# Patient Record
Sex: FEMALE | Race: ASIAN | NOT HISPANIC OR LATINO | Employment: FULL TIME | URBAN - METROPOLITAN AREA
[De-identification: names, ages, dates, MRNs, and addresses within clinical notes are randomized per-mention and may not be internally consistent; named-entity substitution may affect disease eponyms.]

---

## 2017-01-13 ENCOUNTER — HOSPITAL ENCOUNTER (OUTPATIENT)
Facility: AMBULATORY SURGERY CENTER | Age: 52
Discharge: HOME | End: 2017-01-13
Attending: INTERNAL MEDICINE | Admitting: INTERNAL MEDICINE
Payer: COMMERCIAL

## 2017-01-13 VITALS
DIASTOLIC BLOOD PRESSURE: 80 MMHG | HEART RATE: 70 BPM | OXYGEN SATURATION: 100 % | TEMPERATURE: 98.7 F | SYSTOLIC BLOOD PRESSURE: 107 MMHG | RESPIRATION RATE: 16 BRPM

## 2017-01-13 LAB — COLONOSCOPY: NORMAL

## 2017-01-13 PROCEDURE — 45378 DIAGNOSTIC COLONOSCOPY: CPT

## 2017-01-13 PROCEDURE — G8907 PT DOC NO EVENTS ON DISCHARG: HCPCS

## 2017-01-13 PROCEDURE — G8918 PT W/O PREOP ORDER IV AB PRO: HCPCS

## 2017-01-13 RX ORDER — LIDOCAINE 40 MG/G
CREAM TOPICAL
Status: DISCONTINUED | OUTPATIENT
Start: 2017-01-13 | End: 2017-01-13 | Stop reason: HOSPADM

## 2017-01-13 RX ORDER — ONDANSETRON 2 MG/ML
4 INJECTION INTRAMUSCULAR; INTRAVENOUS
Status: DISCONTINUED | OUTPATIENT
Start: 2017-01-13 | End: 2017-01-13 | Stop reason: HOSPADM

## 2017-01-13 RX ORDER — FENTANYL CITRATE 50 UG/ML
INJECTION, SOLUTION INTRAMUSCULAR; INTRAVENOUS PRN
Status: DISCONTINUED | OUTPATIENT
Start: 2017-01-13 | End: 2017-01-13 | Stop reason: HOSPADM

## 2017-07-27 ENCOUNTER — OFFICE VISIT (OUTPATIENT)
Dept: FAMILY MEDICINE | Facility: CLINIC | Age: 52
End: 2017-07-27
Payer: COMMERCIAL

## 2017-07-27 VITALS
DIASTOLIC BLOOD PRESSURE: 70 MMHG | TEMPERATURE: 97.9 F | SYSTOLIC BLOOD PRESSURE: 112 MMHG | HEART RATE: 57 BPM | HEIGHT: 64 IN | OXYGEN SATURATION: 99 % | WEIGHT: 135.3 LBS | BODY MASS INDEX: 23.1 KG/M2

## 2017-07-27 DIAGNOSIS — N94.10 DYSPAREUNIA IN FEMALE: ICD-10-CM

## 2017-07-27 DIAGNOSIS — R73.03 PRE-DIABETES: ICD-10-CM

## 2017-07-27 DIAGNOSIS — Z71.84 TRAVEL ADVICE ENCOUNTER: ICD-10-CM

## 2017-07-27 DIAGNOSIS — T75.3XXS: ICD-10-CM

## 2017-07-27 DIAGNOSIS — R87.610 ASCUS OF CERVIX WITH NEGATIVE HIGH RISK HPV: ICD-10-CM

## 2017-07-27 DIAGNOSIS — Z00.00 ENCOUNTER FOR ROUTINE ADULT HEALTH EXAMINATION WITHOUT ABNORMAL FINDINGS: Primary | ICD-10-CM

## 2017-07-27 LAB
GLUCOSE SERPL-MCNC: 110 MG/DL (ref 70–99)
HBA1C MFR BLD: 6.1 % (ref 4.3–6)

## 2017-07-27 PROCEDURE — 99396 PREV VISIT EST AGE 40-64: CPT | Performed by: FAMILY MEDICINE

## 2017-07-27 PROCEDURE — 36415 COLL VENOUS BLD VENIPUNCTURE: CPT | Performed by: FAMILY MEDICINE

## 2017-07-27 PROCEDURE — 82947 ASSAY GLUCOSE BLOOD QUANT: CPT | Performed by: FAMILY MEDICINE

## 2017-07-27 PROCEDURE — 83036 HEMOGLOBIN GLYCOSYLATED A1C: CPT | Performed by: FAMILY MEDICINE

## 2017-07-27 PROCEDURE — 99212 OFFICE O/P EST SF 10 MIN: CPT | Mod: 25 | Performed by: FAMILY MEDICINE

## 2017-07-27 RX ORDER — CIPROFLOXACIN 500 MG/1
500 TABLET, FILM COATED ORAL 2 TIMES DAILY
Qty: 6 TABLET | Refills: 0 | Status: SHIPPED | OUTPATIENT
Start: 2017-07-27 | End: 2018-10-04

## 2017-07-27 RX ORDER — AZITHROMYCIN 250 MG/1
TABLET, FILM COATED ORAL
Qty: 6 TABLET | Refills: 0 | Status: SHIPPED | OUTPATIENT
Start: 2017-07-27 | End: 2018-10-04

## 2017-07-27 ASSESSMENT — PAIN SCALES - GENERAL: PAINLEVEL: NO PAIN (0)

## 2017-07-27 NOTE — NURSING NOTE
"Chief Complaint   Patient presents with     Physical       Initial /70 (BP Location: Right arm, Patient Position: Chair, Cuff Size: Adult Regular)  Pulse 57  Temp 97.9  F (36.6  C) (Oral)  Ht 1.635 m (5' 4.37\")  Wt 61.4 kg (135 lb 4.8 oz)  LMP 04/10/2015 (Approximate)  SpO2 99%  Breastfeeding? No  BMI 22.96 kg/m2 Estimated body mass index is 22.96 kg/(m^2) as calculated from the following:    Height as of this encounter: 1.635 m (5' 4.37\").    Weight as of this encounter: 61.4 kg (135 lb 4.8 oz).  Medication Reconciliation: complete     ANNA Holly MA      "

## 2017-07-27 NOTE — MR AVS SNAPSHOT
After Visit Summary   7/27/2017    Sudhakar Zarate    MRN: [id number]           Patient Information     Date Of Birth          1965        Visit Information        Provider Department      7/27/2017 9:20 AM Coreen Mike MD [facility]        Today's Diagnoses     Encounter for routine adult health examination without abnormal findings    -  1    Travel sickness, sequela        Travel advice encounter        Pre-diabetes        ASCUS of cervix with negative high risk HPV        Dyspareunia in female          Care Instructions    Follow up with travel clinic, if needed - start oral typhoid medications    Follow up with Orthopedics to discuss right wrist     For vaginal dryness/painful intercourse- trial K-Y jelly. Or, you can use a daily lubricant called Replens      Preventive Health Recommendations  Female Ages 50 - 64    Yearly exam: See your health care provider every year in order to  o Review health changes.   o Discuss preventive care.    o Review your medicines if your doctor has prescribed any.      Get a Pap test every three years (unless you have an abnormal result and your provider advises testing more often).    If you get Pap tests with HPV test, you only need to test every 5 years, unless you have an abnormal result.     You do not need a Pap test if your uterus was removed (hysterectomy) and you have not had cancer.    You should be tested each year for STDs (sexually transmitted diseases) if you're at risk.     Have a mammogram every 1 to 2 years.    Have a colonoscopy at age 50, or have a yearly FIT test (stool test). These exams screen for colon cancer.      Have a cholesterol test every 5 years, or more often if advised.    Have a diabetes test (fasting glucose) every three years. If you are at risk for diabetes, you should have this test more often.     If you are at risk for osteoporosis (brittle bone disease), think about having a bone density scan  (DEXA).    Shots: Get a flu shot each year. Get a tetanus shot every 10 years.    Nutrition:     Eat at least 5 servings of fruits and vegetables each day.    Eat whole-grain bread, whole-wheat pasta and brown rice instead of white grains and rice.    Talk to your provider about Calcium and Vitamin D.     Lifestyle    Exercise at least 150 minutes a week (30 minutes a day, 5 days a week). This will help you control your weight and prevent disease.    Limit alcohol to one drink per day.    No smoking.     Wear sunscreen to prevent skin cancer.     See your dentist every six months for an exam and cleaning.    See your eye doctor every 1 to 2 years.            Follow-ups after your visit        Who to contact     If you have questions or need follow up information about today's clinic visit or your schedule please contact [facility] directly at [phone].  Normal or non-critical lab and imaging results will be communicated to you by MyChart, letter or phone within 4 business days after the clinic has received the results. If you do not hear from us within 7 days, please contact the clinic through [employer] or phone. If you have a critical or abnormal lab result, we will notify you by phone as soon as possible.  Submit refill requests through be2 or call your pharmacy and they will forward the refill request to us. Please allow 3 business days for your refill to be completed.          Additional Information About Your Visit        MyChart Information     be2 gives you secure access to your electronic health record. If you see a primary care provider, you can also send messages to your care team and make appointments. If you have questions, please call your primary care clinic.  If you do not have a primary care provider, please call [phone] and they will assist you.        Care EveryWhere ID     This is your Care EveryWhere ID. This could be used by other organizations to access your  "[facility] medical records  [id number]        Your Vitals Were     Pulse Temperature Height Last Period Pulse Oximetry Breastfeeding?    57 97.9  F (36.6  C) (Oral) 1.635 m (5' 4.37\") 04/10/2015 (Approximate) 99% No    BMI (Body Mass Index)                   22.96 kg/m2            Blood Pressure from Last 3 Encounters:   07/27/17 112/70   01/13/17 107/80   12/02/16 110/82    Weight from Last 3 Encounters:   07/27/17 61.4 kg (135 lb 4.8 oz)   12/02/16 61.1 kg (134 lb 11.2 oz)   01/09/17 60.3 kg (133 lb)              We Performed the Following     Glucose     Hemoglobin A1c          Today's Medication Changes          These changes are accurate as of: 7/27/17 10:04 AM.  If you have any questions, ask your nurse or doctor.               Start taking these medicines.        Dose/Directions    azithromycin 250 MG tablet   Commonly known as:  ZITHROMAX Z-SANDRA   Used for:  Travel sickness, sequela   Started by:  Coreen Mike MD        Two tabs on day #1, then one tab daily x 4 more days   Quantity:  6 tablet   Refills:  0       ciprofloxacin 500 MG tablet   Commonly known as:  CIPRO   Used for:  Travel sickness, sequela   Started by:  Coreen Mike MD        Dose:  500 mg   Take 1 tablet (500 mg) by mouth 2 times daily   Quantity:  6 tablet   Refills:  0       typhoid CR capsule   Commonly known as:  VIVOTIF   Used for:  Travel advice encounter   Started by:  Coreen Mike MD        Dose:  1 capsule   Take 1 capsule by mouth every other day For typhoid vaccine: take 1 hour before meal with cold or lukewarm drink. Avoid alcohol within 1 hour of each dose. Do not cut/crush/chew.   Quantity:  4 capsule   Refills:  0            Where to get your medicines      These medications were sent to [id number] - [place], [place] - [address] AT [facility] [place] [address] [facility] [address]  [address], [place] [address]    Hours:  24-hours Phone:  [phone]     " azithromycin 250 MG tablet    ciprofloxacin 500 MG tablet         Some of these will need a paper prescription and others can be bought over the counter.  Ask your nurse if you have questions.     Bring a paper prescription for each of these medications     typhoid CR capsule                Primary Care Provider Office Phone # Fax #    Coreen Mike [phone]       [facility] [address] [place] [address] [address]  [facility] [address]        Equal Access to Services     MERLY BARBOZA : Hadii aad ku hadasho Soomaali, waaxda luqadaha, qaybta kaalmada adeegyada, waxay idiin hayaan adeeg kharash lawicho . So [place] -[phone].    ATENCIÓN: Si gene gonzalez, tiene a eduardo disposición servicios gratuitos de asistencia lingüística. Jeir al [phone].    We comply with applicable federal civil rights laws and [place] laws. We do not discriminate on the basis of race, color, national origin, age, disability sex, sexual orientation or gender identity.            Thank you!     Thank you for choosing [facility]  for your care. Our goal is always to provide you with excellent care. Hearing back from our patients is one way we can continue to improve our services. Please take a few minutes to complete the written survey that you may receive in the mail after your visit with us. Thank you!             Your Updated Medication List - Protect others around you: Learn how to safely use, store and throw away your medicines at www.disposemymeds.org.          This list is accurate as of: 7/27/17 10:04 AM.  Always use your most recent med list.                   Brand Name Dispense Instructions for use Diagnosis    azithromycin 250 MG tablet    ZITHROMAX Z-SANDRA    6 tablet    Two tabs on day #1, then one tab daily x 4 more days    Travel sickness, sequela       calcium carbonate 1250 MG tablet    OS-FABIOLA 500 mg [place]. [place]     Take 500 mg by mouth daily        ciprofloxacin 500 MG tablet    CIPRO    6  tablet    Take 1 tablet (500 mg) by mouth 2 times daily    Travel sickness, sequela       COENZYME Q-10 PO           FISH OIL PO      1 PO QD        MULTIVITAMIN TABS   OR     30    one tab daily        typhoid CR capsule    VIVOTIF    4 capsule    Take 1 capsule by mouth every other day For typhoid vaccine: take 1 hour before meal with cold or lukewarm drink. Avoid alcohol within 1 hour of each dose. Do not cut/crush/chew.    Travel advice encounter       vitamin B complex with vitamin C Tabs tablet      Take 1 tablet by mouth daily        vitamin D 1000 UNITS capsule      Take 1 capsule by mouth daily.

## 2017-07-27 NOTE — PATIENT INSTRUCTIONS
Follow up with travel clinic, if needed - start oral typhoid medications    Follow up with Orthopedics to discuss right wrist     For vaginal dryness/painful intercourse- trial K-Y jelly. Or, you can use a daily lubricant called Replens      Preventive Health Recommendations  Female Ages 50 - 64    Yearly exam: See your health care provider every year in order to  o Review health changes.   o Discuss preventive care.    o Review your medicines if your doctor has prescribed any.      Get a Pap test every three years (unless you have an abnormal result and your provider advises testing more often).    If you get Pap tests with HPV test, you only need to test every 5 years, unless you have an abnormal result.     You do not need a Pap test if your uterus was removed (hysterectomy) and you have not had cancer.    You should be tested each year for STDs (sexually transmitted diseases) if you're at risk.     Have a mammogram every 1 to 2 years.    Have a colonoscopy at age 50, or have a yearly FIT test (stool test). These exams screen for colon cancer.      Have a cholesterol test every 5 years, or more often if advised.    Have a diabetes test (fasting glucose) every three years. If you are at risk for diabetes, you should have this test more often.     If you are at risk for osteoporosis (brittle bone disease), think about having a bone density scan (DEXA).    Shots: Get a flu shot each year. Get a tetanus shot every 10 years.    Nutrition:     Eat at least 5 servings of fruits and vegetables each day.    Eat whole-grain bread, whole-wheat pasta and brown rice instead of white grains and rice.    Talk to your provider about Calcium and Vitamin D.     Lifestyle    Exercise at least 150 minutes a week (30 minutes a day, 5 days a week). This will help you control your weight and prevent disease.    Limit alcohol to one drink per day.    No smoking.     Wear sunscreen to prevent skin cancer.     See your dentist every six  months for an exam and cleaning.    See your eye doctor every 1 to 2 years.

## 2017-07-27 NOTE — PROGRESS NOTES
SUBJECTIVE:   CC: Sudhakar Zarate is an 52 year old woman who presents for preventive health visit.     Healthy Habits:    Do you get at least three servings of calcium containing foods daily (dairy, green leafy vegetables, etc.)? yes    Amount of exercise or daily activities, outside of work: 7 day(s) per week    Problems taking medications regularly No    Medication side effects: No    Have you had an eye exam in the past two years? yes    Do you see a dentist twice per year? Yes    Do you have sleep apnea, excessive snoring or daytime drowsiness?no    Right Wrist -- Discussed work comp issues, went to PT which helped, but she still experiences symptoms with using a computer mouse, typing, etc. She reported improved symptoms with rest and not being at work for a few days. She has tried multiple braces and ergonomic mouses/keyboards.     She stated that she was seen by orthopedics at [facility], and her work comp denied any MRI's.     Menopause -- Mild dyspareunia and mild insomnia ongoing.      Prediabetes -- Sudhakar stated that she has been working on improving her diet and has sought out resources to  her in doing so.     TRAVEL: she is traveling again to [place] in a week. Has brought cipro with her in past for traveler's diarrhea/uti and also brings zpak in case of bronchitis- she's had this several times from pollution.     Lab Results   Component Value Date    A1C 6.0 12/02/2016    A1C 6.2 08/31/2015    A1C 5.7 09/22/2014    A1C 5.9 09/19/2013    A1C 6.0 04/18/2012     Glucose   Date Value Ref Range Status   12/02/2016 96 70 - 99 mg/dL Final     Comment:     Non Fasting     Creatinine   Date Value Ref Range Status   04/25/2012 0.58 0.52 - 1.04 mg/dL Final     Lipids -- Fish oil tablet and Coenzyme Q-10 tablet daily.     Recent Labs   Lab Test  08/31/15   1221  09/22/14   0852   CHOL  182  139   HDL  89  76   LDL  83  55   TRIG  52  38   CHOLHDLRATIO  2.0  1.8       Past/recent records reviewed and discussed for --  family hx, social hx, surgical hx, medications, immunizations, allergies.      Today's PHQ-2 Score:   PHQ-2 (  [employer]) 2016   Q1: Little interest or pleasure in doing things 0 -   Q2: Feeling down, depressed or hopeless 0 -   PHQ-2 Score 0 -   Q1: Little interest or pleasure in doing things Not at all Not at all   Q2: Feeling down, depressed or hopeless Not at all Not at all   PHQ-2 Score 0 0     Abuse: Current or Past(Physical, Sexual or Emotional)- No  Do you feel safe in your environment - Yes  Social History   Substance Use Topics     Smoking status: Never Smoker     Smokeless tobacco: Never Used     Alcohol use Yes      Comment: rare     The patient does not drink >3 drinks per day nor >7 drinks per week.    Reviewed orders with patient.  Reviewed health maintenance and updated orders accordingly - Yes  Labs reviewed in [facility]    Patient over age 50, mutual decision to screen reflected in health maintenance.    Pertinent mammograms are reviewed under the imaging tab.  History of abnormal Pap smear: NO - age 30- 65 PAP every 3 years recommended    Reviewed and updated as needed this visit by clinical staffTobacco  Allergies  Meds  Med Hx  Surg Hx  Fam Hx  Soc Hx      Reviewed and updated as needed this visit by Provider        Past Medical History:   Diagnosis Date     ASCUS of cervix with negative high risk HPV 2016 ASCUS/Neg HPV. Plan: cotest in 3 years. Due by 19     Female infertility of unspecified origin      Flank pain     following uti. CT abd and urol w/u neg     GERD (gastroesophageal reflux disease) 2012     Mammographic microcalcification     s/p bx : benign breast parenchyma     Spontaneous      x 1, VIP x1        ROS:  10 point ROS of systems including Constitutional, Eyes, Respiratory, Cardiovascular, Gastroenterology, Genitourinary, Integumentary, Muscularskeletal, Psychiatric were all negative except for pertinent positives noted  "in my HPI.    This document serves as a record of the services and decisions personally performed and made by Coreen Mike MD. It was created on their behalf by Flynn Mcmanus, a trained medical scribe. The creation of this document is based the provider's statements to the medical scribe.  Flynn Mcmanus July 27, 2017 9:33 AM    OBJECTIVE:   /70 (BP Location: Right arm, Patient Position: Chair, Cuff Size: Adult Regular)  Pulse 57  Temp 97.9  F (36.6  C) (Oral)  Ht 1.635 m (5' 4.37\")  Wt 61.4 kg (135 lb 4.8 oz)  LMP 04/10/2015 (Approximate)  SpO2 99%  Breastfeeding? No  BMI 22.96 kg/m2  EXAM:  GENERAL APPEARANCE: healthy, alert and no distress  EYES: Eyes grossly normal to inspection, PERRL and conjunctivae and sclerae normal  HENT: ear canals and TM's normal, nose and mouth without ulcers or lesions, oropharynx clear and oral mucous membranes moist  NECK: no adenopathy, no asymmetry, masses, or scars and thyroid normal to palpation  RESP: lungs clear to auscultation - no rales, rhonchi or wheezes  BREAST: normal without masses, tenderness or nipple discharge and no palpable axillary masses or adenopathy  CV: regular rate and rhythm, normal S1 S2, no S3 or S4, no murmur, click or rub, no peripheral edema and peripheral pulses strong  ABDOMEN: soft, nontender, no hepatosplenomegaly, no masses and bowel sounds normal   (female): normal female external genitalia, normal urethral meatus, vaginal mucosal atrophy noted, normal cervix, very mild right adnexal tenderness noted without masses, and uterus without masses or abnormal discharge  MS: no musculoskeletal defects are noted and gait is age appropriate without ataxia  SKIN: no suspicious lesions or rashes to visible skin  NEURO: mentation intact and speech normal  PSYCH: mentation appears normal and affect normal/bright    ASSESSMENT/PLAN:   1. Encounter for routine adult health examination without abnormal findings  - Glucose    2. Travel sickness, " "sequela  3. Travel advice encounter  Pt will be traveling to [place] next week for 3 weeks- abx for prn use. Given. Reviewed CDC traveler's page- typhoid vaccine recommended. Discussed life vaccine pros/con's and appropriate use, etc.   - ciprofloxacin (CIPRO) 500 MG tablet; Take 1 tablet (500 mg) by mouth 2 times daily  Dispense: 6 tablet; Refill: 0  - azithromycin (ZITHROMAX Z-SANDRA) 250 MG tablet; Two tabs on day #1, then one tab daily x 4 more days  Dispense: 6 tablet; Refill: 0  - typhoid (VIVOTIF) CR capsule; Take 1 capsule by mouth every other day For typhoid vaccine: take 1 hour before meal with cold or lukewarm drink. Avoid alcohol within 1 hour of each dose. Do not cut/crush/chew.  Dispense: 4 capsule; Refill: 0    4. Pre-diabetes  Hx of elevated glucose, pt working on diet  - Glucose  - Hemoglobin A1c    5. ASCUS of cervix with negative high risk HPV  See HPI    6. Dyspareunia in female  New since menopause. Discussed otc lubricants. Consider estrogen creams in future if ongoing sx's.       COUNSELING:   Reviewed preventive health counseling, as reflected in patient instructions     reports that she has never smoked. She has never used smokeless tobacco.    Estimated body mass index is 22.96 kg/(m^2) as calculated from the following:    Height as of this encounter: 1.635 m (5' 4.37\").    Weight as of this encounter: 61.4 kg (135 lb 4.8 oz).     Counseling Resources:  ATP IV Guidelines  Pooled Cohorts Equation Calculator  Breast Cancer Risk Calculator  FRAX Risk Assessment  ICSI Preventive Guidelines  Dietary Guidelines for Americans, 2010  USDA's MyPlate  ASA Prophylaxis  Lung CA Screening    The information in this document, created by the medical scribe for me, accurately reflects the services I personally performed and the decisions made by me. I have reviewed and approved this document for accuracy.   MD Coreen Bingham MD  [facility]"

## 2017-07-28 PROBLEM — R79.89 ELEVATED DHEA: Status: RESOLVED | Noted: 2017-07-28 | Resolved: 2017-07-28

## 2017-07-28 PROBLEM — R79.89 ELEVATED DHEA: Status: ACTIVE | Noted: 2017-07-28

## 2018-08-13 ENCOUNTER — OFFICE VISIT (OUTPATIENT)
Dept: PODIATRY | Facility: CLINIC | Age: 53
End: 2018-08-13
Payer: COMMERCIAL

## 2018-08-13 ENCOUNTER — TELEPHONE (OUTPATIENT)
Dept: FAMILY MEDICINE | Facility: CLINIC | Age: 53
End: 2018-08-13

## 2018-08-13 ENCOUNTER — RADIANT APPOINTMENT (OUTPATIENT)
Dept: GENERAL RADIOLOGY | Facility: CLINIC | Age: 53
End: 2018-08-13
Attending: FAMILY MEDICINE
Payer: COMMERCIAL

## 2018-08-13 VITALS
HEIGHT: 64 IN | BODY MASS INDEX: 23.05 KG/M2 | DIASTOLIC BLOOD PRESSURE: 70 MMHG | SYSTOLIC BLOOD PRESSURE: 112 MMHG | WEIGHT: 135 LBS

## 2018-08-13 DIAGNOSIS — M25.562 ACUTE PAIN OF LEFT KNEE: Primary | ICD-10-CM

## 2018-08-13 DIAGNOSIS — M25.562 ACUTE PAIN OF LEFT KNEE: ICD-10-CM

## 2018-08-13 PROCEDURE — 99203 OFFICE O/P NEW LOW 30 MIN: CPT | Performed by: FAMILY MEDICINE

## 2018-08-13 PROCEDURE — 73562 X-RAY EXAM OF KNEE 3: CPT | Performed by: FAMILY MEDICINE

## 2018-08-13 NOTE — LETTER
2018         RE: Sudhakar Zarate  [address] [place] [address]        Dear Colleague,    Thank you for referring your patient, Sudhakar Zarate, to the [facility]. Please see a copy of my visit note below.    HPI   [facility]   Clinic Visit s Aug 13, 2018    PCP: Coreen Mike      Sudhakar is a 53 year old female who is seen as self referral for   Chief Complaint   Patient presents with     Left Knee - Pain       Injury: Reports insidious onset without acute precipitating event.       Location of Pain: left knee medial, nonradiating   Duration of Pain: 4 day(s)  Rating of Pain at worst: 6/10  Rating of Pain Currently: 3/10  Pain is better with: ibuprofen , heating pad, hot bath and ice   Pain is worse with: standing and walking    Treatment so far consists of: no other treatment tried to date  Associated symptoms: swelling Mild  Recent imaging completed: No recent imaging completed.  Prior History of related problems: none    Social History: is employed as a/an programming computers      Past Medical History:   Diagnosis Date     ASCUS of cervix with negative high risk HPV 2016 ASCUS/Neg HPV. Plan: cotest in 3 years. Due by 19     Female infertility of unspecified origin      Flank pain     following uti. CT abd and urol w/u neg     GERD (gastroesophageal reflux disease) 2012     Mammographic microcalcification     s/p bx : benign breast parenchyma     Spontaneous      x 1, VIP x1       Patient Active Problem List    Diagnosis Date Noted     Pre-diabetes 2016     Priority: Medium     ASCUS of cervix with negative high risk HPV 2016     Priority: Medium     16 ASCUS/Neg HPV. Plan: cotest in 3 years. Due by 19       Uterine fibroid 2012     Priority: Medium     CARDIOVASCULAR SCREENING; LDL GOAL LESS THAN 160 10/31/2010     Priority: Medium     History of infertility, female 2008      Priority: Medium     follows with endocrine and reproductive specialist. Slightly elevated DHEA-S (Dr. Schuyler moise 2008: mild CAH with the interference that the ratio of her 17 (OH)hormones is causing with the DHEA-S. F/u prn.   Consider spironolactone for acne.        Nevi 2008     Priority: Medium     vulva         Family History   Problem Relation Age of Onset     C.A.D. Mother      started later 40's or early 50s'.      Diabetes Mother      Hypertension Mother      Cancer Father      lung, former smoker     Diabetes Brother      Diabetes Sister      borderline diabetes     Thyroid Cancer Sister      caught early stage.        Social History     Social History     Marital status:      Spouse name: N/A     Number of children: 1     Years of education: N/A     Occupational History      - IT [employer]     Social History Main Topics     Smoking status: Never Smoker     Smokeless tobacco: Never Used     Alcohol use Yes      Comment: rare     Drug use: No     Sexual activity: Yes     Partners: Male     Other Topics Concern      Service No     Blood Transfusions No     Caffeine Concern No     Occupational Exposure No     Hobby Hazards No     Sleep Concern No     Stress Concern No     Weight Concern No     Special Diet No     Back Care No     Exercise Yes     6 times per week and plays tennis     Bike Helmet No     Seat Belt Yes     Self-Exams No     Parent/Sibling W/ Cabg, Mi Or Angioplasty Before 65f 55m? No     Social History Narrative    Works as  with [employer]     with one daughter    Immigrant from [place] in     Voodoo: Mosque               Past Surgical History:   Procedure Laterality Date     C ANESTH, SECTION       COLONOSCOPY WITH CO2 INSUFFLATION N/A 2017    Procedure: COLONOSCOPY WITH CO2 INSUFFLATION;  Surgeon: Duane, William Charles, MD;  Location: [facility] OR      DILATION/CURETTAGE DIAG/THER NON OB       "following incomplete ab         Review of Systems   Musculoskeletal: Positive for joint pain.   All other systems reviewed and are negative.        Physical Exam   Musculoskeletal:        Left knee: Medial joint line and MCL tenderness noted.     /70  Ht 5' 4.37\" (1.635 m)  Wt 135 lb (61.2 kg)  LMP 04/10/2015 (Approximate)  BMI 22.91 kg/m2  Constitutional:well-developed, well-nourished, and in no distress.   Cardiovascular: Intact distal pulses.    Neurological: alert. Gait Abnormal:   Antalgic gait  Skin: Skin is warm and dry.   Psychiatric: Mood and affect normal.   Respiratory: unlabored, speaks in full sentences  Lymph: no LAD, no lymphangitis    Left Knee Exam   Swelling: None  Effusion: No    Tenderness   The patient is experiencing tenderness in the medial joint line, MCL.    Range of Motion   Extension: Normal  Flexion:     Normal    Tests   McMurrays:  Medial - Negative      Lateral - Positive  Lachman:  Anterior - Negative    Posterior - Negative  Drawer:       Anterior - Negative     Posterior - Negative  Varus:  Negative  Valgus: Positive  Pivot Shift: Negative  Patellar Apprehension: No    Comments:  Lateral Andrea's pain is on the medial side.          X-ray images Ordered and independently reviewed by me in the office today with the patient. X-ray shows:   Recent Results (from the past 48 hour(s))   XR Knee Standing AP Bilat [place] Bilat Lat Left    Narrative    8/13/2018    No fracture, dislocation and or lesion. Normal alignment.  Joint space   maintained no significant arthritis. No appreciable soft tissue   abnormality           ASSESSMENT/PLAN    ICD-10-CM    1. Acute pain of left knee M25.562 XR Knee Standing AP Bilat [place] Bilat Lat Left     order for DME     Medial knee pain with normal x-rays and exam suggestive of MCL sprain though without specific valgus stress incident, however repeated micro-valgus movements during dance class may have created a chronic situation.  " Differential includes meniscus injury as well.  Discussed options including bracing and support with knee brace and reduced activity for 1-2 weeks versus obtaining MRI.  Patient opted for knee bracing trial and if not improved in 1-2 weeks may call for MRI and/or return for recheck.    Again, thank you for allowing me to participate in the care of your patient.        Sincerely,        Jesus Muller MD

## 2018-08-13 NOTE — TELEPHONE ENCOUNTER
Sudhakar Zarate is a 53 year old female who calls with left knee pain.    NURSING ASSESSMENT:  Description:  Patient states last week she started doing some general exercises throughout the week Monday through Thursday, and that after she finished on Thursday she began to have lateral pain felt in her left knee.   Onset/duration:  5 days  Precip. factors:  Patient started doing exercises at home  Associated symptoms:  Left lateral knee pain, pain when walking, slight swelling, tender to the touch.   Improves/worsens symptoms:  Ice improves, walking worsens  Pain scale (0-10)   4/10  Last exam/Treatment:  Over 1 year ago  Allergies: No Known Allergies    MEDICATIONS:   Patient states taking ibuprofen for pain - some relief. She has also been icing and apply heat alternating.     NURSING PLAN: Nursing advice to patient be evaluated by a provider in clinic today    RECOMMENDED DISPOSITION:  See in 4 hours, another person to drive - Patient is in [place] and was wanting to see a provider at that [place] location as her PCP is booked today. She was given the phone number to [place] location to scheduled. Denies further questions.  Will comply with recommendation: Yes  If further questions/concerns or if symptoms do not improve, worsen or new symptoms develop, call your PCP or [place] [facility] as soon as possible.      Guideline used:  Telephone Triage Protocols for Nurses, Fifth Edition, Jackie Hsu RN

## 2018-08-13 NOTE — MR AVS SNAPSHOT
"              After Visit Summary   8/13/2018    Sudhakar Zarate    MRN: [id number]           Patient Information     Date Of Birth          1965        Visit Information        Provider Department      8/13/2018 3:40 PM Jesus Muller MD [facility]        Today's Diagnoses     Acute pain of left knee    -  1       Follow-ups after your visit        Who to contact     If you have questions or need follow up information about today's clinic visit or your schedule please contact [facility] directly at [phone].  Normal or non-critical lab and imaging results will be communicated to you by MyChart, letter or phone within 4 business days after the clinic has received the results. If you do not hear from us within 7 days, please contact the clinic through Assembly[employer] or phone. If you have a critical or abnormal lab result, we will notify you by phone as soon as possible.  Submit refill requests through [employer] or call your pharmacy and they will forward the refill request to us. Please allow 3 business days for your refill to be completed.          Additional Information About Your Visit        MyChart Information     [employer] gives you secure access to your electronic health record. If you see a primary care provider, you can also send messages to your care team and make appointments. If you have questions, please call your primary care clinic.  If you do not have a primary care provider, please call [phone] and they will assist you.        Care EveryWhere ID     This is your Care EveryWhere ID. This could be used by other organizations to access your [place] medical records  [id number]        Your Vitals Were     Height Last Period BMI (Body Mass Index)             5' 4.37\" (1.635 m) 04/10/2015 (Approximate) 22.91 kg/m2          Blood Pressure from Last 3 Encounters:   08/13/18 112/70   07/27/17 112/70   01/13/17 107/80    Weight from Last 3 Encounters:   08/13/18 135 lb (61.2 kg) "   07/27/17 135 lb 4.8 oz (61.4 kg)   12/02/16 134 lb 11.2 oz (61.1 kg)                 Today's Medication Changes          These changes are accurate as of 8/13/18 11:59 PM.  If you have any questions, ask your nurse or doctor.               Start taking these medicines.        Dose/Directions    order for DME   Used for:  Acute pain of left knee   Started by:  Jesus Muller MD        Equipment being ordered: hinged knee brace medium   Quantity:  1 Device   Refills:  0            Where to get your medicines      Some of these will need a paper prescription and others can be bought over the counter.  Ask your nurse if you have questions.     Bring a paper prescription for each of these medications     order for DME                Primary Care Provider Office Phone # Fax #    Coreen Ale Mike [phone] [address]  [facility] [address]        Equal Access to Services     [facility]: Hadii aad ku hadasho Soomaali, waaxda luqadaha, qaybta kaalmada adeegyada, waxay jesikain hayaan kati lopez . So [facility] [phone].    ATENCIÓN: Si habla español, tiene a eduardo disposición servicios gratuitos de asistencia lingüística. Jeri al [phone].    We comply with applicable federal civil rights laws and [place] laws. We do not discriminate on the basis of race, color, national origin, age, disability, sex, sexual orientation, or gender identity.            Thank you!     Thank you for choosing [facility]  for your care. Our goal is always to provide you with excellent care. Hearing back from our patients is one way we can continue to improve our services. Please take a few minutes to complete the written survey that you may receive in the mail after your visit with us. Thank you!             Your Updated Medication List - Protect others around you: Learn how to safely use, store and throw away your medicines at www.disposemymeds.org.          This list  is accurate as of 8/13/18 11:59 PM.  Always use your most recent med list.                   Brand Name Dispense Instructions for use Diagnosis    azithromycin 250 MG tablet    ZITHROMAX Z-SANDRA    6 tablet    Two tabs on day #1, then one tab daily x 4 more days    Travel sickness, sequela       calcium carbonate 500 MG tablet    OS-FABIOLA 500 mg [place]. [place]     Take 500 mg by mouth daily        ciprofloxacin 500 MG tablet    CIPRO    6 tablet    Take 1 tablet (500 mg) by mouth 2 times daily    Travel sickness, sequela       COENZYME Q-10 PO           FISH OIL PO      1 PO QD        MULTIVITAMIN TABS   OR     30    one tab daily        order for DME     1 Device    Equipment being ordered: hinged knee brace medium    Acute pain of left knee       typhoid CR capsule    VIVOTIF    4 capsule    Take 1 capsule by mouth every other day For typhoid vaccine: take 1 hour before meal with cold or lukewarm drink. Avoid alcohol within 1 hour of each dose. Do not cut/crush/chew.    Travel advice encounter       vitamin B complex with vitamin C Tabs tablet      Take 1 tablet by mouth daily        vitamin D 1000 units capsule      Take 1 capsule by mouth daily.

## 2018-08-13 NOTE — TELEPHONE ENCOUNTER
Reason for Call:  Other     Detailed comments: Patient injured her left leg and asking which options she would have of what kind of provider she should see?    Phone Number Patient can be reached at: Cell number on file:    Telephone Information:   Mobile [phone]       Best Time: any    Can we leave a detailed message on this number? YES    Call taken on 8/13/2018 at 10:35 AM by Anali Ramírez

## 2018-08-13 NOTE — PROGRESS NOTES
[facility]   Clinic Visit s Aug 13, 2018    PCP: Coreen Mike Ale      Sudhakar is a 53 year old female who is seen as self referral for   Chief Complaint   Patient presents with     Left Knee - Pain       Injury: Reports insidious onset without acute precipitating event.       Location of Pain: left knee medial, nonradiating   Duration of Pain: 4 day(s)  Rating of Pain at worst: 6/10  Rating of Pain Currently: 3/10  Pain is better with: ibuprofen , heating pad, hot bath and ice   Pain is worse with: standing and walking    Treatment so far consists of: no other treatment tried to date  Associated symptoms: swelling Mild  Recent imaging completed: No recent imaging completed.  Prior History of related problems: none    Social History: is employed as a/an programming computers      Past Medical History:   Diagnosis Date     ASCUS of cervix with negative high risk HPV 2016 ASCUS/Neg HPV. Plan: cotest in 3 years. Due by 19     Female infertility of unspecified origin      Flank pain     following uti. CT abd and urol w/u neg     GERD (gastroesophageal reflux disease) 2012     Mammographic microcalcification     s/p bx : benign breast parenchyma     Spontaneous      x 1, VIP x1       Patient Active Problem List    Diagnosis Date Noted     Pre-diabetes 2016     Priority: Medium     ASCUS of cervix with negative high risk HPV 2016     Priority: Medium     16 ASCUS/Neg HPV. Plan: cotest in 3 years. Due by 19       Uterine fibroid 2012     Priority: Medium     CARDIOVASCULAR SCREENING; LDL GOAL LESS THAN 160 10/31/2010     Priority: Medium     History of infertility, female 2008     Priority: Medium     follows with endocrine and reproductive specialist. Slightly elevated DHEA-S (Dr. Schuyler moise 2008: mild CAH with the interference that the ratio of her 17 (OH)hormones is causing with the DHEA-S. F/u prn.    Consider spironolactone for acne.        Nevi 2008     Priority: Medium     vulva         Family History   Problem Relation Age of Onset     C.A.D. Mother      started later 40's or early 50s'.      Diabetes Mother      Hypertension Mother      Cancer Father      lung, former smoker     Diabetes Brother      Diabetes Sister      borderline diabetes     Thyroid Cancer Sister      caught early stage.        Social History     Social History     Marital status:      Spouse name: N/A     Number of children: 1     Years of education: N/A     Occupational History      - IT [employer]     Social History Main Topics     Smoking status: Never Smoker     Smokeless tobacco: Never Used     Alcohol use Yes      Comment: rare     Drug use: No     Sexual activity: Yes     Partners: Male     Other Topics Concern      Service No     Blood Transfusions No     Caffeine Concern No     Occupational Exposure No     Hobby Hazards No     Sleep Concern No     Stress Concern No     Weight Concern No     Special Diet No     Back Care No     Exercise Yes     6 times per week and plays tennis     Bike Helmet No     Seat Belt Yes     Self-Exams No     Parent/Sibling W/ Cabg, Mi Or Angioplasty Before 65f 55m? No     Social History Narrative    Works as  with [employer]     with one daughter    Immigrant from [place] in     Christianity: Taoism               Past Surgical History:   Procedure Laterality Date     C ANESTH, SECTION       COLONOSCOPY WITH CO2 INSUFFLATION N/A 2017    Procedure: COLONOSCOPY WITH CO2 INSUFFLATION;  Surgeon: Duane, William Charles, MD;  Location: [facility] OR     HC DILATION/CURETTAGE DIAG/THER NON OB      following incomplete ab         Review of Systems   Musculoskeletal: Positive for joint pain.   All other systems reviewed and are negative.        Physical Exam   Musculoskeletal:        Left knee: Medial joint line and MCL tenderness  "noted.     /70  Ht 5' 4.37\" (1.635 m)  Wt 135 lb (61.2 kg)  LMP 04/10/2015 (Approximate)  BMI 22.91 kg/m2  Constitutional:well-developed, well-nourished, and in no distress.   Cardiovascular: Intact distal pulses.    Neurological: alert. Gait Abnormal:   Antalgic gait  Skin: Skin is warm and dry.   Psychiatric: Mood and affect normal.   Respiratory: unlabored, speaks in full sentences  Lymph: no LAD, no lymphangitis    Left Knee Exam   Swelling: None  Effusion: No    Tenderness   The patient is experiencing tenderness in the medial joint line, MCL.    Range of Motion   Extension: Normal  Flexion:     Normal    Tests   McMurrays:  Medial - Negative      Lateral - Positive  Lachman:  Anterior - Negative    Posterior - Negative  Drawer:       Anterior - Negative     Posterior - Negative  Varus:  Negative  Valgus: Positive  Pivot Shift: Negative  Patellar Apprehension: No    Comments:  Lateral Andrea's pain is on the medial side.          X-ray images Ordered and independently reviewed by me in the office today with the patient. X-ray shows:   Recent Results (from the past 48 hour(s))   XR Knee Standing AP Bilat [place] Bilat Lat Left    Narrative    8/13/2018    No fracture, dislocation and or lesion. Normal alignment.  Joint space   maintained no significant arthritis. No appreciable soft tissue   abnormality           ASSESSMENT/PLAN    ICD-10-CM    1. Acute pain of left knee M25.562 XR Knee Standing AP Bilat [place] Bilat Lat Left     order for DME     Medial knee pain with normal x-rays and exam suggestive of MCL sprain though without specific valgus stress incident, however repeated micro-valgus movements during dance class may have created a chronic situation.  Differential includes meniscus injury as well.  Discussed options including bracing and support with knee brace and reduced activity for 1-2 weeks versus obtaining MRI.  Patient opted for knee bracing trial and if not improved in 1-2 weeks may " call for MRI and/or return for recheck.

## 2018-08-20 DIAGNOSIS — M25.562 ACUTE PAIN OF LEFT KNEE: Primary | ICD-10-CM

## 2018-08-22 ENCOUNTER — THERAPY VISIT (OUTPATIENT)
Dept: PHYSICAL THERAPY | Facility: CLINIC | Age: 53
End: 2018-08-22
Attending: FAMILY MEDICINE
Payer: COMMERCIAL

## 2018-08-22 DIAGNOSIS — M25.562 LEFT KNEE PAIN: Primary | ICD-10-CM

## 2018-08-22 PROCEDURE — 97110 THERAPEUTIC EXERCISES: CPT | Mod: GP | Performed by: PHYSICAL THERAPIST

## 2018-08-22 PROCEDURE — 97140 MANUAL THERAPY 1/> REGIONS: CPT | Mod: GP | Performed by: PHYSICAL THERAPIST

## 2018-08-22 PROCEDURE — 97161 PT EVAL LOW COMPLEX 20 MIN: CPT | Mod: GP | Performed by: PHYSICAL THERAPIST

## 2018-08-22 ASSESSMENT — ACTIVITIES OF DAILY LIVING (ADL)
HOW_WOULD_YOU_RATE_THE_OVERALL_FUNCTION_OF_YOUR_KNEE_DURING_YOUR_USUAL_DAILY_ACTIVITIES?: ABNORMAL
LIMPING: THE SYMPTOM AFFECTS MY ACTIVITY MODERATELY
KNEE_ACTIVITY_OF_DAILY_LIVING_SCORE: 55.71
STAND: ACTIVITY IS MINIMALLY DIFFICULT
KNEE_ACTIVITY_OF_DAILY_LIVING_SUM: 39
SQUAT: ACTIVITY IS FAIRLY DIFFICULT
STIFFNESS: THE SYMPTOM AFFECTS MY ACTIVITY MODERATELY
KNEEL ON THE FRONT OF YOUR KNEE: ACTIVITY IS SOMEWHAT DIFFICULT
GIVING WAY, BUCKLING OR SHIFTING OF KNEE: THE SYMPTOM AFFECTS MY ACTIVITY MODERATELY
SWELLING: THE SYMPTOM AFFECTS MY ACTIVITY SLIGHTLY
RISE FROM A CHAIR: ACTIVITY IS MINIMALLY DIFFICULT
WALK: ACTIVITY IS SOMEWHAT DIFFICULT
WEAKNESS: THE SYMPTOM AFFECTS MY ACTIVITY MODERATELY
HOW_WOULD_YOU_RATE_THE_CURRENT_FUNCTION_OF_YOUR_KNEE_DURING_YOUR_USUAL_DAILY_ACTIVITIES_ON_A_SCALE_FROM_0_TO_100_WITH_100_BEING_YOUR_LEVEL_OF_KNEE_FUNCTION_PRIOR_TO_YOUR_INJURY_AND_0_BEING_THE_INABILITY_TO_PERFORM_ANY_OF_YOUR_USUAL_DAILY_ACTIVITIES?: 50
GO DOWN STAIRS: ACTIVITY IS FAIRLY DIFFICULT
RAW_SCORE: 39
SIT WITH YOUR KNEE BENT: ACTIVITY IS MINIMALLY DIFFICULT
AS_A_RESULT_OF_YOUR_KNEE_INJURY,_HOW_WOULD_YOU_RATE_YOUR_CURRENT_LEVEL_OF_DAILY_ACTIVITY?: ABNORMAL
GO UP STAIRS: ACTIVITY IS SOMEWHAT DIFFICULT
PAIN: THE SYMPTOM AFFECTS MY ACTIVITY SLIGHTLY

## 2018-08-22 NOTE — PROGRESS NOTES
[facility] Initial Evaluation  Subjective:  Patient is a 53 year old female presenting with rehab left knee hpi.   Sudhakar Zarate is a 53 year old female with a left knee condition.  Condition occurred with:  Repetition/overuse.  Condition occurred: at home.  This is a new condition  Patient is referred with a 2 week hx of L medial knee pain. She was working in the yard, doing repetitive quatting and kneeling and noted pain later that day. She noted mild swelling and loss of ROM. Pain is worse with walking more than a block, stairs and squatting. X-rays were negative..    Patient reports pain:  In the joint and medial.    Pain is described as aching and is constant and reported as 3/10.  Associated symptoms:  Loss of strength and loss of motion/stiffness. Pain is worse during the day.  Symptoms are exacerbated by kneeling, walking, bending/squatting, ascending stairs and descending stairs and relieved by rest.  Since onset symptoms are unchanged.  Special tests:  X-ray.      General health as reported by patient is good.          Current occupation is IT.  Patient is working in normal job without restrictions.  Primary job tasks include:  Prolonged sitting.    Barriers include:  None as reported by the patient.    Red flags:  None as reported by the patient.                        Objective:    Gait:    Gait Type:  Antalgic   Assistive Devices:  None  Deviations:  Knee:  Knee extension decr L                                                      Knee Evaluation:  ROM:  Strength wnl knee: L hip abduction and extension 4/5.    PROM      Extension: Left: 7    Right:  0  Flexion: Left: 130    Right:  145    Endfeel: springy L  Strength:     Extension:  Left: 4/5    Pain:+      Right: 5/5   Pain:  Flexion:  Left: 4+/5    Pain:+      Right: 5/5   Pain:    Quad Set Left: Good    Pain:   Quad Set Right: WNL    Pain:  Ligament Testing:  Normal                Special Tests:   Left knee positive for the following  special tests:  Meniscal    Palpation:    Left knee tenderness present at:  Medial Joint Line    Edema:  Normal    Mobility Testing:  Not Assessed            Functional Testing:  not assessed                  General     ROS    Assessment/Plan:    Patient is a 53 year old female with left side knee complaints.    Patient has the following significant findings with corresponding treatment plan.                Diagnosis 1:  Left knee pain  Pain -  hot/cold therapy, manual therapy, splint/taping/bracing/orthotics, self management, education and home program  Decreased joint mobility - manual therapy, therapeutic exercise and home program  Decreased strength - therapeutic exercise, therapeutic activities and home program  Impaired gait - gait training and home program  Impaired muscle performance - neuro re-education and home program  Decreased function - therapeutic activities and home program    Therapy Evaluation Codes:   1) History comprised of:   Personal factors that impact the plan of care:      None.    Comorbidity factors that impact the plan of care are:      None.     Medications impacting care: None.  2) Examination of Body Systems comprised of:   Body structures and functions that impact the plan of care:      Knee.   Activity limitations that impact the plan of care are:      Sports, Squatting/kneeling, Stairs and Walking.  3) Clinical presentation characteristics are:   Stable/Uncomplicated.  4) Decision-Making    Low complexity using standardized patient assessment instrument and/or measureable assessment of functional outcome.  Cumulative Therapy Evaluation is: Low complexity.    Previous and current functional limitations:  (See Goal Flow Sheet for this information)    Short term and Long term goals: (See Goal Flow Sheet for this information)     Communication ability:  Patient appears to be able to clearly communicate and understand verbal and written communication and follow directions  correctly.  Treatment Explanation - The following has been discussed with the patient:   RX ordered/plan of care  Anticipated outcomes  Possible risks and side effects  This patient would benefit from PT intervention to resume normal activities.   Rehab potential is good.    Frequency:  1-2 X week, once daily  Duration:  for 3 weeks  Discharge Plan:  Achieve all LTG.  Independent in home treatment program.  Reach maximal therapeutic benefit.    Please refer to the daily flowsheet for treatment today, total treatment time and time spent performing 1:1 timed codes.

## 2018-08-22 NOTE — MR AVS SNAPSHOT
After Visit Summary   8/22/2018    Sudhakar Zarate    MRN: [id number]           Patient Information     Date Of Birth          1965        Visit Information        Provider Department      8/22/2018 9:20 AM Paul Lerma PT [facility]        Today's Diagnoses     Left knee pain    -  1       Follow-ups after your visit        Your next 10 appointments already scheduled     Aug 29, 2018  1:50 PM CDT   DAVIAN Extremity with Paul Lerma PT   [facility] ([facility] [place])    [address]  [address]  [place] [address]   [phone]            Oct 22, 2018  1:00 PM CDT   PHYSICAL with Coreen Mike MD   [facility]    [address] [place] [id number] [phone]              Who to contact     If you have questions or need follow up information about today's clinic visit or your schedule please contact [facility] directly at [phone].  Normal or non-critical lab and imaging results will be communicated to you by Long Tailhart, letter or phone within 4 business days after the clinic has received the results. If you do not hear from us within 7 days, please contact the clinic through Long Tailhart or phone. If you have a critical or abnormal lab result, we will notify you by phone as soon as possible.  Submit refill requests through Cabify or call your pharmacy and they will forward the refill request to us. Please allow 3 business days for your refill to be completed.          Additional Information About Your Visit        MyChart Information     Cabify gives you secure access to your electronic health record. If you see a primary care provider, you can also send messages to your care team and make appointments. If you have questions, please call your primary  care clinic.  If you do not have a primary care provider, please call [phone] and they will assist you.        Care EveryWhere ID     This is your Care EveryWhere ID. This could be used by other organizations to access your [facility] medical records  [id number]        Your Vitals Were     Last Period                   04/10/2015 (Approximate)            Blood Pressure from Last 3 Encounters:   08/13/18 112/70   07/27/17 112/70   01/13/17 107/80    Weight from Last 3 Encounters:   08/13/18 61.2 kg (135 lb)   07/27/17 61.4 kg (135 lb 4.8 oz)   12/02/16 61.1 kg (134 lb 11.2 oz)              We Performed the Following     HC PT EVAL, LOW COMPLEXITY     DAVIAN INITIAL EVAL REPORT     MANUAL THER TECH,1+REGIONS,EA 15 MIN     THERAPEUTIC EXERCISES        Primary Care Provider Office Phone # Fax #    Coreen Mike [phone] [address]  [facility] [address]        Equal Access to Services     SHAI BARBOZA : Hadii aad ku hadasho Soomaali, waaxda luqadaha, qaybta kaalmada adeegyada, waxay idiin hayaan kati lopez . So [facility] [phone].    ATENCIÓN: Si habla español, tiene a eduardo disposición servicios gratuitos de asistencia lingüística. Llame al [phone].    We comply with applicable federal civil rights laws and [place] laws. We do not discriminate on the basis of race, color, national origin, age, disability, sex, sexual orientation, or gender identity.            Thank you!     Thank you for choosing [facility]  [facility]  for your care. Our goal is always to provide you with excellent care. Hearing back from our patients is one way we can continue to improve our services. Please take a few minutes to complete the written survey that you may receive in the mail after your visit with us. Thank you!             Your Updated Medication List - Protect others around you: Learn how to safely use, store and throw away your  medicines at www.disposemymeds.org.          This list is accurate as of 8/22/18 11:31 AM.  Always use your most recent med list.                   Brand Name Dispense Instructions for use Diagnosis    azithromycin 250 MG tablet    ZITHROMAX Z-SANDRA    6 tablet    Two tabs on day #1, then one tab daily x 4 more days    Travel sickness, sequela       calcium carbonate 500 MG tablet    OS-FABIOLA 500 mg [place]. Ca     Take 500 mg by mouth daily        ciprofloxacin 500 MG tablet    CIPRO    6 tablet    Take 1 tablet (500 mg) by mouth 2 times daily    Travel sickness, sequela       COENZYME Q-10 PO           FISH OIL PO      1 PO QD        MULTIVITAMIN TABS   OR     30    one tab daily        order for DME     1 Device    Equipment being ordered: hinged knee brace medium    Acute pain of left knee       typhoid CR capsule    VIVOTIF    4 capsule    Take 1 capsule by mouth every other day For typhoid vaccine: take 1 hour before meal with cold or lukewarm drink. Avoid alcohol within 1 hour of each dose. Do not cut/crush/chew.    Travel advice encounter       vitamin B complex with vitamin C Tabs tablet      Take 1 tablet by mouth daily        vitamin D 1000 units capsule      Take 1 capsule by mouth daily.

## 2018-08-29 ENCOUNTER — THERAPY VISIT (OUTPATIENT)
Dept: PHYSICAL THERAPY | Facility: CLINIC | Age: 53
End: 2018-08-29
Payer: COMMERCIAL

## 2018-08-29 DIAGNOSIS — M25.562 LEFT KNEE PAIN: Primary | ICD-10-CM

## 2018-08-29 PROCEDURE — 97112 NEUROMUSCULAR REEDUCATION: CPT | Mod: GP | Performed by: PHYSICAL THERAPIST

## 2018-08-29 PROCEDURE — 97010 HOT OR COLD PACKS THERAPY: CPT | Mod: GP | Performed by: PHYSICAL THERAPIST

## 2018-08-29 PROCEDURE — 97110 THERAPEUTIC EXERCISES: CPT | Mod: GP | Performed by: PHYSICAL THERAPIST

## 2018-09-07 ENCOUNTER — THERAPY VISIT (OUTPATIENT)
Dept: PHYSICAL THERAPY | Facility: CLINIC | Age: 53
End: 2018-09-07
Payer: COMMERCIAL

## 2018-09-07 DIAGNOSIS — M25.562 LEFT KNEE PAIN: ICD-10-CM

## 2018-09-07 PROCEDURE — 97112 NEUROMUSCULAR REEDUCATION: CPT | Mod: GP | Performed by: PHYSICAL THERAPIST

## 2018-09-07 PROCEDURE — 97110 THERAPEUTIC EXERCISES: CPT | Mod: GP | Performed by: PHYSICAL THERAPIST

## 2018-09-07 NOTE — MR AVS SNAPSHOT
After Visit Summary   9/7/2018    Sudhakar Zarate    MRN: [id number]           Patient Information     Date Of Birth          1965        Visit Information        Provider Department      9/7/2018 1:50 PM Paul Lerma PT [facility]        Today's Diagnoses     Left knee pain           Follow-ups after your visit        Your next 10 appointments already scheduled     Oct 22, 2018  1:00 PM CDT   PHYSICAL with Coreen Mike MD   [facility]    [address] [place] [address] [phone]              Who to contact     If you have questions or need follow up information about today's clinic visit or your schedule please contact [facility] directly at [phone].  Normal or non-critical lab and imaging results will be communicated to you by MyChart, letter or phone within 4 business days after the clinic has received the results. If you do not hear from us within 7 days, please contact the clinic through Neighbor.lyhart or phone. If you have a critical or abnormal lab result, we will notify you by phone as soon as possible.  Submit refill requests through tinyclues or call your pharmacy and they will forward the refill request to us. Please allow 3 business days for your refill to be completed.          Additional Information About Your Visit        MyChart Information     tinyclues gives you secure access to your electronic health record. If you see a primary care provider, you can also send messages to your care team and make appointments. If you have questions, please call your primary care clinic.  If you do not have a primary care provider, please call [phone] and they will assist you.        Care EveryWhere ID     This is your Care EveryWhere ID. This could be used by other organizations to access your [facility]  records  [id number]        Your Vitals Were     Last Period                   04/10/2015 (Approximate)            Blood Pressure from Last 3 Encounters:   08/13/18 112/70   07/27/17 112/70   01/13/17 107/80    Weight from Last 3 Encounters:   08/13/18 61.2 kg (135 lb)   07/27/17 61.4 kg (135 lb 4.8 oz)   12/02/16 61.1 kg (134 lb 11.2 oz)              We Performed the Following     NEUROMUSCULAR RE-EDUCATION     THERAPEUTIC EXERCISES        Primary Care Provider Office Phone # Fax #    Coreen Ale Mike [phone] [address]  [facility] [address]        Equal Access to Services     MERLY BARBOZA : Hadii edwin rahmano Sobebe, waaxda luqadaha, qaybta kaalmada adeegyada, jess lopez . So [facility] [phone].    ATENCIÓN: Si habla español, tiene a eduardo disposición servicios gratuitos de asistencia lingüística. Ll[facility] [phone].    We comply with applicable federal civil rights laws and [place] laws. We do not discriminate on the basis of race, color, national origin, age, disability, sex, sexual orientation, or gender identity.            Thank you!     Thank you for choosing [facility]  for your care. Our goal is always to provide you with excellent care. Hearing back from our patients is one way we can continue to improve our services. Please take a few minutes to complete the written survey that you may receive in the mail after your visit with us. Thank you!             Your Updated Medication List - Protect others around you: Learn how to safely use, store and throw away your medicines at www.disposemymeds.org.          This list is accurate as of 9/7/18  2:21 PM.  Always use your most recent med list.                   Brand Name Dispense Instructions for use Diagnosis    azithromycin 250 MG tablet    ZITHROMAX Z-SANDRA    6 tablet    Two tabs on day #1, then one tab daily x 4 more days     Travel sickness, sequela       calcium carbonate 500 mg {elemental} 500 MG tablet    OS-FABIOLA     Take 500 mg by mouth daily        ciprofloxacin 500 MG tablet    CIPRO    6 tablet    Take 1 tablet (500 mg) by mouth 2 times daily    Travel sickness, sequela       COENZYME Q-10 PO           FISH OIL PO      1 PO QD        MULTIVITAMIN TABS   OR     30    one tab daily        order for DME     1 Device    Equipment being ordered: hinged knee brace medium    Acute pain of left knee       typhoid CR capsule    VIVOTIF    4 capsule    Take 1 capsule by mouth every other day For typhoid vaccine: take 1 hour before meal with cold or lukewarm drink. Avoid alcohol within 1 hour of each dose. Do not cut/crush/chew.    Travel advice encounter       vitamin B complex with vitamin C Tabs tablet      Take 1 tablet by mouth daily        vitamin D 1000 units capsule      Take 1 capsule by mouth daily.

## 2018-10-04 ENCOUNTER — TELEPHONE (OUTPATIENT)
Dept: UROLOGY | Facility: CLINIC | Age: 53
End: 2018-10-04

## 2018-10-04 ENCOUNTER — RADIANT APPOINTMENT (OUTPATIENT)
Dept: CT IMAGING | Facility: CLINIC | Age: 53
End: 2018-10-04
Attending: FAMILY MEDICINE
Payer: COMMERCIAL

## 2018-10-04 ENCOUNTER — TELEPHONE (OUTPATIENT)
Dept: FAMILY MEDICINE | Facility: CLINIC | Age: 53
End: 2018-10-04

## 2018-10-04 ENCOUNTER — OFFICE VISIT (OUTPATIENT)
Dept: FAMILY MEDICINE | Facility: CLINIC | Age: 53
End: 2018-10-04
Payer: COMMERCIAL

## 2018-10-04 VITALS
TEMPERATURE: 98.6 F | WEIGHT: 142 LBS | SYSTOLIC BLOOD PRESSURE: 112 MMHG | HEART RATE: 63 BPM | BODY MASS INDEX: 24.24 KG/M2 | RESPIRATION RATE: 16 BRPM | OXYGEN SATURATION: 99 % | DIASTOLIC BLOOD PRESSURE: 78 MMHG | HEIGHT: 64 IN

## 2018-10-04 DIAGNOSIS — Z23 NEED FOR PROPHYLACTIC VACCINATION AND INOCULATION AGAINST INFLUENZA: ICD-10-CM

## 2018-10-04 DIAGNOSIS — Z12.31 VISIT FOR SCREENING MAMMOGRAM: ICD-10-CM

## 2018-10-04 DIAGNOSIS — R31.29 MICROSCOPIC HEMATURIA: ICD-10-CM

## 2018-10-04 DIAGNOSIS — R10.9 LEFT FLANK PAIN: ICD-10-CM

## 2018-10-04 DIAGNOSIS — R10.9 LEFT FLANK PAIN: Primary | ICD-10-CM

## 2018-10-04 LAB

## 2018-10-04 PROCEDURE — 90686 IIV4 VACC NO PRSV 0.5 ML IM: CPT | Performed by: FAMILY MEDICINE

## 2018-10-04 PROCEDURE — 87086 URINE CULTURE/COLONY COUNT: CPT | Performed by: FAMILY MEDICINE

## 2018-10-04 PROCEDURE — 90471 IMMUNIZATION ADMIN: CPT | Performed by: FAMILY MEDICINE

## 2018-10-04 PROCEDURE — 81001 URINALYSIS AUTO W/SCOPE: CPT | Performed by: FAMILY MEDICINE

## 2018-10-04 PROCEDURE — 99214 OFFICE O/P EST MOD 30 MIN: CPT | Mod: 25 | Performed by: FAMILY MEDICINE

## 2018-10-04 PROCEDURE — 74176 CT ABD & PELVIS W/O CONTRAST: CPT | Performed by: RADIOLOGY

## 2018-10-04 RX ORDER — GABAPENTIN 100 MG/1
100 CAPSULE ORAL
COMMUNITY
Start: 2017-09-06 | End: 2018-10-04

## 2018-10-04 RX ORDER — HYDROCODONE BITARTRATE AND ACETAMINOPHEN 5; 325 MG/1; MG/1
1 TABLET ORAL EVERY 4 HOURS PRN
Qty: 20 TABLET | Refills: 0 | Status: SHIPPED | OUTPATIENT
Start: 2018-10-04 | End: 2020-01-07

## 2018-10-04 RX ORDER — CEPHALEXIN 500 MG/1
500 CAPSULE ORAL 3 TIMES DAILY
Qty: 21 CAPSULE | Refills: 0 | Status: SHIPPED | OUTPATIENT
Start: 2018-10-04 | End: 2018-10-11

## 2018-10-04 NOTE — TELEPHONE ENCOUNTER
Patient advised that CT result is still pending. Once reviewed by PCP or a provider, will call patient with result.   She understands. Also fyi, patient says she does not feel the need to take oxycodone. Pain is tolerable. Patient advised to take as needed for pain.     LXIONG3, MEDICAL ASSISTANT

## 2018-10-04 NOTE — MR AVS SNAPSHOT
After Visit Summary   10/4/2018    Sudhakar Zarate    MRN: [id number]           Patient Information     Date Of Birth          1965        Visit Information        Provider Department      10/4/2018 11:00 AM Coreen Mike MD [facility]        Today's Diagnoses     Left flank pain    -  1    Microscopic hematuria        Visit for screening mammogram        Need for prophylactic vaccination and inoculation against influenza           Follow-ups after your visit        Additional Services     UROLOGY ADULT REFERRAL       Your provider has referred you to: [facility]: [facility] [phone]   [email or web address] [facility] [phone]   [email or web address]    Please be aware that coverage of these services is subject to the terms and limitations of your health insurance plan.  Call member services at your health plan with any benefit or coverage questions.      Please bring the following with you to your appointment:    (1) Any X-Rays, CTs or MRIs which have been performed.  Contact the facility where they were done to arrange for  prior to your scheduled appointment.    (2) List of current medications  (3) This referral request   (4) Any documents/labs given to you for this referral                  Your next 10 appointments already scheduled     Oct 22, 2018  8:40 AM CDT   LAB with BA LAB ONLY   [facility] ([facility])    [address] [place] [address] [phone]           Please do not eat 10-12 hours before your appointment if you are coming in fasting for labs on lipids, cholesterol, or glucose (sugar). This does not apply to pregnant women. Water, hot tea and black coffee (with nothing added) are okay. Do not drink other fluids, diet soda  "or chew gum.            Oct 25, 2018 10:40 AM CDT   PHYSICAL with Coreen Mike MD   [facility] ([facility])    [address] [address] [phone]              Future tests that were ordered for you today     Open Future Orders        Priority Expected Expires Ordered    CT Abdomen Pelvis w Contrast STAT  10/4/2019 10/4/2018    MA SCREENING DIGITAL BILAT - Future  (s+30) Routine  10/4/2019 10/4/2018            Who to contact     If you have questions or need follow up information about today's clinic visit or your schedule please contact [facility] directly at [phone].  Normal or non-critical lab and imaging results will be communicated to you by NetSanityhart, letter or phone within 4 business days after the clinic has received the results. If you do not hear from us within 7 days, please contact the clinic through Espressit or phone. If you have a critical or abnormal lab result, we will notify you by phone as soon as possible.  Submit refill requests through Stylehive or call your pharmacy and they will forward the refill request to us. Please allow 3 business days for your refill to be completed.          Additional Information About Your Visit        Stylehive Information     Stylehive gives you secure access to your electronic health record. If you see a primary care provider, you can also send messages to your care team and make appointments. If you have questions, please call your primary care clinic.  If you do not have a primary care provider, please call [phone] and they will assist you.        Care EveryWhere ID     This is your Care EveryWhere ID. This could be used by other organizations to access your [facility] medical records  [id number]        Your Vitals Were     Pulse Temperature Respirations Height Last Period Pulse Oximetry    63 98.6  F (37  C) (Oral) 16 1.635 m (5' 4.37\") 04/10/2015 (Approximate) 99%    " BMI (Body Mass Index)                   24.09 kg/m2            Blood Pressure from Last 3 Encounters:   10/04/18 112/78   08/13/18 112/70   07/27/17 112/70    Weight from Last 3 Encounters:   10/04/18 64.4 kg (142 lb)   08/13/18 61.2 kg (135 lb)   07/27/17 61.4 kg (135 lb 4.8 oz)              We Performed the Following     HC FLU VAC PRESRV FREE QUAD SPLIT VIR 3+YRS IM  [46023]     UA reflex to Microscopic and Culture     Urine Culture Aerobic Bacterial     Urine Microscopic     UROLOGY ADULT REFERRAL     VACCINE ADMINISTRATION, INITIAL          Today's Medication Changes          These changes are accurate as of 10/4/18 12:11 PM.  If you have any questions, ask your nurse or doctor.               Start taking these medicines.        Dose/Directions    HYDROcodone-acetaminophen 5-325 MG per tablet   Commonly known as:  NORCO   Used for:  Left flank pain   Started by:  Coreen Mike MD        Dose:  1 tablet   Take 1 tablet by mouth every 4 hours as needed for breakthrough pain, pain or moderate to severe pain   Quantity:  20 tablet   Refills:  0            Where to get your medicines      Some of these will need a paper prescription and others can be bought over the counter.  Ask your nurse if you have questions.     Bring a paper prescription for each of these medications     HYDROcodone-acetaminophen 5-325 MG per tablet               Information about OPIOIDS     PRESCRIPTION OPIOIDS: WHAT YOU NEED TO KNOW   We gave you an opioid (narcotic) pain medicine. It is important to manage your pain, but opioids are not always the best choice. You should first try all the other options your care team gave you. Take this medicine for as short a time (and as few doses) as possible.    Some activities can increase your pain, such as bandage changes or therapy sessions. It may help to take your pain medicine 30 to 60 minutes before these activities. Reduce your stress by getting enough sleep, working on hobbies  you enjoy and practicing relaxation or meditation. Talk to your care team about ways to manage your pain beyond prescription opioids.    These medicines have risks:    DO NOT drive when on new or higher doses of pain medicine. These medicines can affect your alertness and reaction times, and you could be arrested for driving under the influence (DUI). If you need to use opioids long-term, talk to your care team about driving.    DO NOT operate heavy machinery    DO NOT do any other dangerous activities while taking these medicines.    DO NOT drink any alcohol while taking these medicines.     If the opioid prescribed includes acetaminophen, DO NOT take with any other medicines that contain acetaminophen. Read all labels carefully. Look for the word  acetaminophen  or  Tylenol.  Ask your pharmacist if you have questions or are unsure.    You can get addicted to pain medicines, especially if you have a history of addiction (chemical, alcohol or substance dependence). Talk to your care team about ways to reduce this risk.    All opioids tend to cause constipation. Drink plenty of water and eat foods that have a lot of fiber, such as fruits, vegetables, prune juice, apple juice and high-fiber cereal. Take a laxative (Miralax, milk of magnesia, Colace, Senna) if you don t move your bowels at least every other day. Other side effects include upset stomach, sleepiness, dizziness, throwing up, tolerance (needing more of the medicine to have the same effect), physical dependence and slowed breathing.    Store your pills in a secure place, locked if possible. We will not replace any lost or stolen medicine. If you don t finish your medicine, please throw away (dispose) as directed by your pharmacist. The [place] [employer]ution [employer] has more information about safe disposal: [email or web address].[email or web address].us/living-green/managing-unwanted-medications         Primary Care Provider Office Phone # Fax #    Coreen Aguilera  MD Rashid [phone]       [address]  [facility] [address]        Equal Access to Services     MERLY BARBOZA : Hadii aad ku hadjenniferpatricia Salinabebe, neeta lawandaadeleha, casper kageorge jaramillo, jess jesikain hayaan tajandrea stafford john gallardo. So [facility] [phone].    ATENCIÓN: Si habla español, tiene a eduardo disposición servicios gratuitos de asistencia lingüística. Llame al [phone].    We comply with applicable federal civil rights laws and [place] laws. We do not discriminate on the basis of race, color, national origin, age, disability, sex, sexual orientation, or gender identity.            Thank you!     Thank you for choosing [facility]  for your care. Our goal is always to provide you with excellent care. Hearing back from our patients is one way we can continue to improve our services. Please take a few minutes to complete the written survey that you may receive in the mail after your visit with us. Thank you!             Your Updated Medication List - Protect others around you: Learn how to safely use, store and throw away your medicines at www.disposemymeds.org.          This list is accurate as of 10/4/18 12:11 PM.  Always use your most recent med list.                   Brand Name Dispense Instructions for use Diagnosis    calcium carbonate 500 mg (elemental) 500 MG tablet    OS-FABIOLA     Take 500 mg by mouth daily        COENZYME Q-10 PO           FISH OIL PO      1 PO QD        HYDROcodone-acetaminophen 5-325 MG per tablet    NORCO    20 tablet    Take 1 tablet by mouth every 4 hours as needed for breakthrough pain, pain or moderate to severe pain    Left flank pain       MULTIVITAMIN TABS   OR     30    one tab daily        order for DME     1 Device    Equipment being ordered: hinged knee brace medium    Acute pain of left knee       typhoid CR capsule    VIVOTIF    4 capsule    Take 1 capsule by mouth every other day For typhoid vaccine: take 1 hour before meal  with cold or lukewarm drink. Avoid alcohol within 1 hour of each dose. Do not cut/crush/chew.    Travel advice encounter       vitamin D 1000 units capsule      Take 1 capsule by mouth daily.

## 2018-10-04 NOTE — TELEPHONE ENCOUNTER
This writer contacted patient and moved upcoming visit with Dr Espinoza 10/8/18 0900 to 1300 due to 1300 being available. Patient verbalized underastanding    Addi CONRAD

## 2018-10-04 NOTE — TELEPHONE ENCOUNTER
Reason for Call:  Other call back    Detailed comments: Sudhakar would like a call today in regards to her CT results she just had done. Said she needs instructions for her medication.  Please call and let her know either way about the CT if the results are in or not.   Thank you    Phone Number Patient can be reached at: Home number on file [phone] (home)    Best Time: Any    Can we leave a detailed message on this number? YES    Call taken on 10/4/2018 at 2:47 PM by Jean-Claude Arteaga

## 2018-10-04 NOTE — PROGRESS NOTES
SUBJECTIVE:   Sudhakar Zarate is a 53 year old female who presents to clinic today for the following health issues:      Back Pain     Duration: woke her up this morning at 4AM, currently still painful.         Specific cause: none    Description:   Location of pain: low lateral back left  Character of pain: dull ache  Pain radiation:none  New numbness or weakness in legs, not attributed to pain:  no     Intensity: Currently 5/10, when not moving 3/10    History:           No previous similar symptoms.  Pain interferes with job: YES  History of back problems: Yes mid back   Any previous MRI or X-rays: 20 years ago back xray   Sees a specialist for back pain:  No  Therapies tried without relief: fluid, warm shower and heat    Alleviating factors:   Improved by: none      Precipitating factors:  Worsened by: lifting, bending, twisting, moving in general - able to do slowly    Accompanying Signs & Symptoms:  Risk of Fracture:  None  Risk of Cauda Equina:  None  Risk of Infection:  None  Risk of Cancer:  None    Denies abdominal pain, cough, congestion, rhinorrhea, no blood in stool, no change in bowel movements, dysuria, hematuria, personal history of nephrolithiasis, chest pain, shortness of breath, wheezing, new back stressors, recent travel or long periods of sitting, numbness or radiation to lower extremities or abdomen, bumps on the road do not exacerbate, or vaginal itching or other vaginal symptoms.    Problem list and histories reviewed & adjusted, as indicated.  Additional history: as documented    Patient Active Problem List   Diagnosis     History of infertility, female     Nevi     CARDIOVASCULAR SCREENING; LDL GOAL LESS THAN 160     Uterine fibroid     Pre-diabetes     ASCUS of cervix with negative high risk HPV     Left knee pain     Past Surgical History:   Procedure Laterality Date     C ANESTH, SECTION       COLONOSCOPY WITH CO2 INSUFFLATION N/A 2017    Procedure: COLONOSCOPY WITH CO2  INSUFFLATION;  Surgeon: Duane, William Charles, MD;  Location: [facility] OR     HC DILATION/CURETTAGE DIAG/THER NON OB      following incomplete ab       Social History   Substance Use Topics     Smoking status: Never Smoker     Smokeless tobacco: Never Used     Alcohol use Yes      Comment: rare     Family History   Problem Relation Age of Onset     C.A.D. Mother      started later 40's or early 50s'.      Diabetes Mother      Hypertension Mother      Cancer Father      lung, former smoker     Diabetes Brother      Diabetes Sister      borderline diabetes     Thyroid Cancer Sister      caught early stage.          Current Outpatient Prescriptions   Medication Sig Dispense Refill     calcium carbonate (OS-FABIOLA 500 MG [place]. [place]) 500 MG tablet Take 500 mg by mouth daily       Cholecalciferol (VITAMIN D) 1000 UNIT capsule Take 1 capsule by mouth daily.       COENZYME Q-10 PO        FISH OIL OR 1 PO QD       MULTIVITAMIN TABS   OR one tab daily 30 0     order for DME Equipment being ordered: hinged knee brace medium (Patient not taking: Reported on 10/4/2018) 1 Device 0     typhoid (VIVOTIF) CR capsule Take 1 capsule by mouth every other day For typhoid vaccine: take 1 hour before meal with cold or lukewarm drink. Avoid alcohol within 1 hour of each dose. Do not cut/crush/chew. (Patient not taking: Reported on 8/13/2018) 4 capsule 0     No Known Allergies  Recent Labs   Lab Test  07/27/17   1003  12/02/16   0823  08/31/15   1221  09/22/14   0852  09/19/13   1154  04/25/12   1250   01/07/11   0942  01/07/11   0939 10/05/10   A1C  6.1*  6.0  6.2*  5.7  5.9   --    < >   --    --    --    LDL   --    --   83  55  75   --    < >   --    --    --    HDL   --    --   89  76  52   --    < >   --    --    --    TRIG   --    --   52  38  47   --    < >   --    --    --    ALT   --    --    --    --    --   21   --   34   --    --    CR   --    --    --    --    --   0.58   --   0.63   --    --    GFRESTIMATED   --    --    --    --    --  "  >90   --   >90   --    --    GFRESTBLACK   --    --    --    --    --   >90   --   >90   --    --    POTASSIUM   --    --    --    --    --   3.8   --   4.1   --    --    TSH   --    --    --    --    --    --    --    --   0.74  1.06    < > = values in this interval not displayed.      BP Readings from Last 3 Encounters:   10/04/18 112/78   08/13/18 112/70   07/27/17 112/70    Wt Readings from Last 3 Encounters:   10/04/18 64.4 kg (142 lb)   08/13/18 61.2 kg (135 lb)   07/27/17 61.4 kg (135 lb 4.8 oz)        Reviewed and updated as needed this visit by clinical staff  Tobacco  Allergies  Meds  Med Hx  Surg Hx  Fam Hx  Soc Hx      Reviewed and updated as needed this visit by Provider       ROS:  Constitutional, HEENT, cardiovascular, pulmonary, gi and gu systems are negative, except as otherwise noted.    This document serves as a record of the services and decisions personally performed and made by Coreen Mike MD. It was created on his/her behalf by Arminda Alcala, trained medical scribe. The creation of this document is based the provider's statements to the medical scribes.    Scribfern Alcala, 11:20 AM October 4, 2018  OBJECTIVE:     /78 (BP Location: Right arm, Patient Position: Sitting, Cuff Size: Adult Regular)  Pulse 63  Temp 98.6  F (37  C) (Oral)  Resp 16  Ht 1.635 m (5' 4.37\")  Wt 64.4 kg (142 lb)  LMP 04/10/2015 (Approximate)  SpO2 99%  BMI 24.09 kg/m2  Body mass index is 24.09 kg/(m^2).     GENERAL: healthy, alert and no distress  HENT: ear canals and TM's normal, nose and mouth without ulcers or lesions  NECK: no adenopathy, no asymmetry, masses, or scars and thyroid normal to palpation  RESP: lungs clear to auscultation - no rales, rhonchi or wheezes  CV: regular rate and rhythm, normal S1 S2, no S3 or S4, no murmur, click or rub, no peripheral edema and peripheral pulses strong  ABDOMEN: soft, nontender, no hepatosplenomegaly, no masses and bowel sounds " normal  MS: no gross musculoskeletal defects noted, no edema  SKIN: no suspicious lesions or rashes  NEURO: Normal strength and tone, mentation intact and speech normal  BACK: no CVA tenderness, no paralumbar tenderness  Comprehensive back pain exam:  Pain limits the following motions: forward flexion, Lower extremity strength functional and equal on both sides and Lower extremity sensation normal and equal on both sides    Diagnostic Test Results:  Results for orders placed or performed in visit on 10/04/18 (from the past 24 hour(s))   UA reflex to Microscopic and Culture   Result Value Ref Range    Color Urine Yellow     Appearance Urine Clear     Glucose Urine Negative NEG^Negative mg/dL    Bilirubin Urine Negative NEG^Negative    Ketones Urine Negative NEG^Negative mg/dL    Specific Gravity Urine 1.010 1.003 - 1.035    Blood Urine Large (A) NEG^Negative    pH Urine 7.5 (H) 5.0 - 7.0 pH    Protein Albumin Urine Negative NEG^Negative mg/dL    Urobilinogen Urine 0.2 0.2 - 1.0 EU/dL    Nitrite Urine Negative NEG^Negative    Leukocyte Esterase Urine Small (A) NEG^Negative    Source Midstream Urine    Urine Microscopic   Result Value Ref Range    WBC Urine 0 - 5 OTO5^0 - 5 /HPF    RBC Urine 2-5 (A) OTO2^O - 2 /HPF    Squamous Epithelial /LPF Urine Moderate (A) FEW^Few /LPF    Bacteria Urine Moderate (A) NEG^Negative /HPF       ASSESSMENT/PLAN:     1. Left flank pain  2. Microscopic hematuria  Suspicious of renal colic.   UA with reflex to microscopic and culture placed today; results as above. Urology referral for patient to follow up with. Advised getting a CT of her abdomen and pelvis ; order placed today for patient to complete. Discussed that she can take 600 mg ibuprofen every 6 hours for her pain and norco for break-through. Discussed signs of infection to monitor for. Advised that if her pain or symptoms worsen to report to the ED for pain management and further imaging.   - UA reflex to Microscopic and  Culture  - Urine Microscopic  - UROLOGY ADULT REFERRAL  - Urine Culture Aerobic Bacterial  - CT Abdomen Pelvis w Contrast; Future      3. Visit for screening mammogram  Referral provided for patient for schedule.   - MA SCREENING DIGITAL BILAT - Future  (s+30); Future    4. Need for prophylactic vaccination and inoculation against influenza    - HC FLU VAC PRESRV FREE QUAD SPLIT VIR 3+YRS IM  [[id number]]  - VACCINE ADMINISTRATION, INITIAL    The information in this document, created by the medical scribe for me, accurately reflects the services I personally performed and the decisions made by me. I have reviewed and approved this document for accuracy.     Coreen Mike MD  [facility]    Injectable Influenza Immunization Documentation    1.  Is the person to be vaccinated sick today?   No    2. Does the person to be vaccinated have an allergy to a component   of the vaccine?   No  Egg Allergy Algorithm Link    3. Has the person to be vaccinated ever had a serious reaction   to influenza vaccine in the past?   No    4. Has the person to be vaccinated ever had Guillain-Barré syndrome?   No    Form completed by LASHAWN, MEDICAL ASSISTANT            ADDENDUM:  Reviewed ct scan - called patient  Renal stones are present but not in ureter on left side.   Patient reports her pain is improved, down to 2/10. She did not take any pain relievers.   rec ice/heat, ibuprofen prn, ? If msk. Sent cephalexin rx to pharmacy and should start if develop worsening sx's or urinary sx's. miralax for stool burden  F/u with urology for the stones, but update me if pain is worsening/changing.     EXAMINATION: CT ABDOMEN PELVIS W/O CONTRAST, 10/4/2018 2:01 PM.     TECHNIQUE: Helical CT images from the lung bases through the symphysis  pubis were obtained without IV contrast.      COMPARISON: CT 6/11/2007     HISTORY: Hematuria and flank pain, r/o nephrolithiasis; Left flank  pain.     FINDINGS:     Abdomen and pelvis:    There are at least three nonobstructing stones in the left renal  collecting system. There are two stones in the superior pole and one  in the inferior pole, the largest measuring up to 7 mm. There are no  definite right renal stones, ureteral stones, or bladder stones.  However, there is a punctate 2 mm calcification at the level of the  mid/distal right ureter (series 3, image 267), which appears to be  within the wall of the right internal iliac artery although this is  where the ureter courses anterior to the vessel and there are no  atherosclerotic calcifications in the remainder of the abdomen.     There is a 13 mm simple hepatic cyst along the gallbladder fossa in  the right hepatic lobe. Scattered other subcentimeter hepatic  hypodensities are too small to characterize, likely representing  simple cysts. The gallbladder, spleen, pancreas, and adrenal glands  appear normal.     No intra-abdominal free air or free fluid. No dilated loops of bowel.  Along the greater curvature of the stomach, there is a 14 mm  low-density fluid collection. The appendix is not visualized. Moderate  to large stool burden. Normal caliber abdominal aorta. No  lymphadenopathy in the abdomen or pelvis.     Lower chest:   The heart is not enlarged. No pericardial effusion. Solid 3 mm nodules  in the posterior right lower lobe (series 3, image 40 and 38), stable  since 2007 and statistically benign. Solid 3 mm nodule in the medial  left lower lobe (series 3, image 11), likely not included in the  field-of-view on CT 6/11/2007. Mild fibroatelectasis. No focal  consolidation or pleural effusion.     Bones and soft tissues:   Tiny periumbilical fat-containing hernia. No acute or worrisome  osseous lesions.            IMPRESSION:   1. Multiple nonobstructing stones in the left renal collecting system.  No hydronephrosis or hydroureter.  2. Nonspecific punctate 2 mm calcification at the level of the  mid/distal right ureter, favored to  represent a focal atherosclerotic  calcification in the right internal iliac artery.  3. Low-density 14 mm fluid collection along the greater curvature the  stomach is favored to represent a simple exophytic left hepatic cyst  with gastric diverticulum considered less likely.     I have personally reviewed the examination and initial interpretation  and I agree with the findings.     CHAVO WALLACE MD

## 2018-10-05 ENCOUNTER — TELEPHONE (OUTPATIENT)
Dept: UROLOGY | Facility: CLINIC | Age: 53
End: 2018-10-05

## 2018-10-05 LAB
BACTERIA SPEC CULT: NO GROWTH
SPECIMEN SOURCE: NORMAL

## 2018-10-05 NOTE — TELEPHONE ENCOUNTER
Nurse called patient and updated that no sooner appt are available. She verbalized understanding.    Shauna Carrion RN, BSN, PHN  [facility]  GI/Gen Surg/Hepatology Care Coordinator

## 2018-10-05 NOTE — TELEPHONE ENCOUNTER
Patient is calling to see if the appointment with Dr. Valencia at 9:00am on 10/08/18- Patient is scheduled at 1 pm 10/08/18.    Please advise.

## 2018-10-08 ENCOUNTER — OFFICE VISIT (OUTPATIENT)
Dept: UROLOGY | Facility: CLINIC | Age: 53
End: 2018-10-08
Attending: FAMILY MEDICINE
Payer: COMMERCIAL

## 2018-10-08 VITALS — DIASTOLIC BLOOD PRESSURE: 81 MMHG | SYSTOLIC BLOOD PRESSURE: 123 MMHG | HEART RATE: 70 BPM | OXYGEN SATURATION: 100 %

## 2018-10-08 DIAGNOSIS — R10.9 FLANK PAIN: Primary | ICD-10-CM

## 2018-10-08 DIAGNOSIS — N20.0 NEPHROLITHIASIS: ICD-10-CM

## 2018-10-08 LAB
ALBUMIN UR-MCNC: NEGATIVE MG/DL
APPEARANCE UR: CLEAR
BILIRUB UR QL STRIP: NEGATIVE
COLOR UR AUTO: ABNORMAL
GLUCOSE UR STRIP-MCNC: NEGATIVE MG/DL
HGB UR QL STRIP: NEGATIVE
KETONES UR STRIP-MCNC: 10 MG/DL
LEUKOCYTE ESTERASE UR QL STRIP: ABNORMAL
NITRATE UR QL: NEGATIVE
PH UR STRIP: 6 PH (ref 5–7)
RBC #/AREA URNS AUTO: NORMAL /HPF
SOURCE: ABNORMAL
SP GR UR STRIP: 1 (ref 1–1.03)
UROBILINOGEN UR STRIP-MCNC: NORMAL MG/DL (ref 0–2)
WBC #/AREA URNS AUTO: NORMAL /HPF

## 2018-10-08 PROCEDURE — 81001 URINALYSIS AUTO W/SCOPE: CPT | Performed by: UROLOGY

## 2018-10-08 PROCEDURE — 99203 OFFICE O/P NEW LOW 30 MIN: CPT | Performed by: UROLOGY

## 2018-10-08 ASSESSMENT — PAIN SCALES - GENERAL: PAINLEVEL: NO PAIN (0)

## 2018-10-08 NOTE — PROGRESS NOTES
CC: left flank pain.    HPI:  Sudhakar Zarate is a 53 year old female asked to be seen in consultation by Dr. Linares for the above.  This problem has been going on since oct 4th.  She had some pain in the lower back.  It resolved that evening but went to her primary in the meantime who gave her some pain medication, obtained a CT and she was given antibiotics.  She only took the antibiotics for a couple of days.  She denies any urinary symptoms.  She denies any history of kidney stones previously.  She did have some gross hematuria that evening and then as soon as she took the antibiotics it stopped.  She denies incontinence.  She reports no recent uti's.    The patient has some constipation.  She is sexually active and has some dyspareunia due to dry\ness.   She denies any vaginal bulge.  She has no neurological or balance problems.     Obstetric Hx:  She is .  The weight of her largest baby was 7 lbs 0oz.  Babies were delivered via C section  Menopause around age 51, HRT:  none    Past Medical History:   Diagnosis Date     ASCUS of cervix with negative high risk HPV 2016 ASCUS/Neg HPV. Plan: cotest in 3 years. Due by 19     Female infertility of unspecified origin      Flank pain     following uti. CT abd and urol w/u neg     GERD (gastroesophageal reflux disease) 2012     Mammographic microcalcification     s/p bx : benign breast parenchyma     Spontaneous      x 1, VIP x1       Past Surgical History:   Procedure Laterality Date     C ANESTH, SECTION       COLONOSCOPY WITH CO2 INSUFFLATION N/A 2017    Procedure: COLONOSCOPY WITH CO2 INSUFFLATION;  Surgeon: Duane, William Charles, MD;  Location: [facility] OR     HC DILATION/CURETTAGE DIAG/THER NON OB      following incomplete ab       Meds, Allergies, FHx and SHx reviewed per nurse's intake note.    ROS is negative on a 14 point scale except for some knee problems.  All other positive and pertinent information  is mentioned in the HPI.    PEx:   Blood pressure 123/81, pulse 70, last menstrual period 04/10/2015, SpO2 100 %, not currently breastfeeding.  Data Unavailable, There is no height or weight on file to calculate BMI., 0 lbs 0 oz  Gen appearance:  Well groomed  HEENT:  EOMI, AT NC  Psych:  Normal Affect  Neuro:  A/O X 3  Skin:  Warm to touch  Resp:  No increased respiratory effort  Vasc:  RRR  lymph:  No LE edema  Flank:  No tenderness  Musk:  Full ROM in extremities  :  deferredg.    UA: UA RESULTS:  Recent Labs   Lab Test  10/04/18   1131   COLOR  Yellow   APPEARANCE  Clear   URINEGLC  Negative   URINEBILI  Negative   URINEKETONE  Negative   SG  1.010   UBLD  Large*   URINEPH  7.5*   PROTEIN  Negative   UROBILINOGEN  0.2   NITRITE  Negative   LEUKEST  Small*   RBCU  2-5*   WBCU  0 - 5       Office Visit on 10/04/2018   Component Date Value Ref Range Status     Color Urine 10/04/2018 Yellow   Final     Appearance Urine 10/04/2018 Clear   Final     Glucose Urine 10/04/2018 Negative  NEG^Negative mg/dL Final     Bilirubin Urine 10/04/2018 Negative  NEG^Negative Final     Ketones Urine 10/04/2018 Negative  NEG^Negative mg/dL Final     Specific Gravity Urine 10/04/2018 1.010  1.003 - 1.035 Final     Blood Urine 10/04/2018 Large* NEG^Negative Final     pH Urine 10/04/2018 7.5* 5.0 - 7.0 pH Final     Protein Albumin Urine 10/04/2018 Negative  NEG^Negative mg/dL Final     Urobilinogen Urine 10/04/2018 0.2  0.2 - 1.0 EU/dL Final     Nitrite Urine 10/04/2018 Negative  NEG^Negative Final     Leukocyte Esterase Urine 10/04/2018 Small* NEG^Negative Final     Source 10/04/2018 Midstream Urine   Final     WBC Urine 10/04/2018 0 - 5  OTO5^0 - 5 /HPF Final     RBC Urine 10/04/2018 2-5* OTO2^O - 2 /HPF Final     Squamous Epithelial /LPF Urine 10/04/2018 Moderate* FEW^Few /LPF Final     Bacteria Urine 10/04/2018 Moderate* NEG^Negative /HPF Final     Specimen Description 10/04/2018 Midstream Urine   Final     Culture Micro  10/04/2018 No growth   Final     CT abd/pelvis 10/4/18      IMPRESSION:   1. Multiple nonobstructing stones in the left renal collecting system.  No hydronephrosis or hydroureter.  2. Nonspecific punctate 2 mm calcification at the level of the  mid/distal right ureter, favored to represent a focal atherosclerotic  calcification in the right internal iliac artery.  3. Low-density 14 mm fluid collection along the greater curvature the  stomach is favored to represent a simple exophytic left hepatic cyst  with gastric diverticulum considered less likely.         ASSESSMENT and PLAN:  This is a 53 year old female with some left lower back pain with some blood in the urine.  This could have been a passing stone but her pain has since resolved.  She does have 3 stones (largest is 7 mm) seen on CT in the left kidney but no hydro.  Different management options were discussed with the patient including observation, surgical intervention with laser lithotripsy, vs. ESWL.  She would like to observe for now.  -check Urine today  -encouraged to drink at least 2 L of water per day  -limit salt intake  -consider limiting high oxylate foods.  -f/u in 6 months with repeat CT to evaluate for interim change.     Thank you for allowing me to participate in Ms. Zarate's care.  I will keep you updated on her progress.    Grady Espinoza MD

## 2018-10-08 NOTE — LETTER
Patient:  Sudhakar Zarate  :   1965  MRN:     [id number]    Ms.Bin Zarate  [address] [place] [address]    2018    Dear ,    We are writing to inform you of your recent test results which are essentially normal.  Resulted Orders   *UA reflex to Microscopic and Culture ([facility]; [facility]; [facility]; [facility]; [facility]; [facility]; [place]; [place])   Result Value Ref Range    Color Urine Light Yellow     Appearance Urine Clear     Glucose Urine Negative NEG^Negative mg/dL    Bilirubin Urine Negative NEG^Negative    Ketones Urine 10 (A) NEG^Negative mg/dL    Specific Gravity Urine 1.005 1.003 - 1.035    Blood Urine Negative NEG^Negative    pH Urine 6.0 5.0 - 7.0 pH    Protein Albumin Urine Negative NEG^Negative mg/dL    Urobilinogen mg/dL Normal 0.0 - 2.0 mg/dL    Nitrite Urine Negative NEG^Negative    Leukocyte Esterase Urine Small (A) NEG^Negative    Source Midstream Urine    Urine Microscopic   Result Value Ref Range    WBC Urine 0 - 5 OTO5^0 - 5 /HPF    RBC Urine O - 2 OTO2^O - 2 /HPF     Please contact the [facility] at [phone] with any questions or concerns.    Sincerely,      Dr. Espinoza's Office

## 2018-10-08 NOTE — MR AVS SNAPSHOT
After Visit Summary   10/8/2018    Sudhakar Zarate    MRN: [id number]           Patient Information     Date Of Birth          1965        Visit Information        Provider Department      10/8/2018 1:00 PM Grady Espinoza MD [facility]        Today's Diagnoses     Flank pain    -  1    Nephrolithiasis          Care Instructions    CT Scan and follow up with Dr Espinoza in 6 months          Follow-ups after your visit        Follow-up notes from your care team     Return in about 6 months (around 4/8/2019).      Your next 10 appointments already scheduled     Oct 08, 2018  2:20 PM CDT   LAB with LAB FIRST FLOOR [facility] ([facility])    [address] [place] [id number] [phone]           Please do not eat 10-12 hours before your appointment if you are coming in fasting for labs on lipids, cholesterol, or glucose (sugar). This does not apply to pregnant women. Water, hot tea and black coffee (with nothing added) are okay. Do not drink other fluids, diet soda or chew gum.            Oct 22, 2018  8:40 AM CDT   LAB with BA LAB ONLY   [facility] ([facility])    [address] [place] [id number] [phone]           Please do not eat 10-12 hours before your appointment if you are coming in fasting for labs on lipids, cholesterol, or glucose (sugar). This does not apply to pregnant women. Water, hot tea and black coffee (with nothing added) are okay. Do not drink other fluids, diet soda or chew gum.            Oct 25, 2018 10:40 AM CDT   PHYSICAL with Coreen Mike MD   [facility] ([facility])    [address] [facility] [address]   [phone]            Apr 08, 2019  1:00 PM CDT   CT ABDOMEN PELVIS W/O CONTRAST with MGCT1   [facility] ([facility])    [address] [place] [address]  N  [facility] [id number] [phone]           How do I prepare for my exam? (Food and drink instructions) To prepare: Do not eat or drink for 2 hours before your exam. If you need to take medicine, you may take it with small sips of water. (We may ask you to take liquid medicine as well.)  How do I prepare for my exam? (Other instructions) Please arrive 30 minutes early for your CT.  Once in the department you might be asked to drink water 15-20 minutes prior to your exam.  If indicated you may be asked to drink an oral contrast in advance of your CT.  If this is the case, the imaging team will let you know or be in contact with you prior to your appointment  Patients over 70 or patients with diabetes or kidney problems: If you haven t had a blood test (creatinine test) within the last 30 days, the Cardiologist/Radiologist may require you to get this test prior to your exam.  If you have diabetes:  Continue to take your metformin medication on the day of your exam  What should I wear: Please wear loose clothing, such as a sweat suit or jogging clothes. Avoid snaps, zippers and other metal. We may ask you to undress and put on a hospital gown.  How long does the exam take: Most scans take less than 20 minutes.  What should I bring: Please bring any scans or X-rays taken at other hospitals, if similar tests were done. Also bring a list of your medicines, including vitamins, minerals and over-the-counter drugs. It is safest to leave personal items at home.  Do I need a : No  is needed.  What do I need to tell my doctor? Be sure to tell your doctor: * If you have any allergies. * If there s any chance you are pregnant. * If you are breastfeeding.  What should I do after the exam: No restrictions, You may resume normal activities.  What is this test: A CT (computed tomography) scan is a series of pictures that allows us to look inside your body. The scanner creates images of the body in cross  sections, much like slices of bread. This helps us see any problems more clearly. You may receive contrast (X-ray dye) before or during your scan. You will be asked to drink the contrast.  Who should I call with questions: If you have any questions, please call the Imaging Department where you will have your exam. Directions, parking instructions, and other information is available on our website, [email or web address].            Apr 08, 2019  1:30 PM CDT   Return Visit with Grady Espinoza MD   [facility] ([facility])    [address] [place] [address] [phone]              Future tests that were ordered for you today     Open Future Orders        Priority Expected Expires Ordered    CT Abdomen Pelvis w/o Contrast Routine 4/8/2019 10/8/2019 10/8/2018            Who to contact     If you have questions or need follow up information about today's clinic visit or your schedule please contact [facility] directly at [phone].  Normal or non-critical lab and imaging results will be communicated to you by [employer], letter or phone within 4 business days after the clinic has received the results. If you do not hear from us within 7 days, please contact the clinic through [employer] or phone. If you have a critical or abnormal lab result, we will notify you by phone as soon as possible.  Submit refill requests through [employer] or call your pharmacy and they will forward the refill request to us. Please allow 3 business days for your refill to be completed.          Additional Information About Your Visit        [employer] Information     [employer] gives you secure access to your electronic health record. If you see a primary care provider, you can also send messages to your care team and make appointments. If you have questions, please call your primary care clinic.  If you do not have a primary care provider, please call [phone] and they will  assist you.      [employer] is an electronic gateway that provides easy, online access to your medical records. With [employer], you can request a clinic appointment, read your test results, renew a prescription or communicate with your care team.     To access your existing account, please contact your [facility] or call [phone] for assistance.        Care EveryWhere ID     This is your Care EveryWhere ID. This could be used by other organizations to access your [place] medical records  [id number]        Your Vitals Were     Pulse Last Period Pulse Oximetry             70 04/10/2015 (Approximate) 100%          Blood Pressure from Last 3 Encounters:   10/08/18 123/81   10/04/18 112/78   08/13/18 112/70    Weight from Last 3 Encounters:   10/04/18 64.4 kg (142 lb)   08/13/18 61.2 kg (135 lb)   07/27/17 61.4 kg (135 lb 4.8 oz)              We Performed the Following     UA reflex to Microscopic and Culture [[id number]]        Primary Care Provider Office Phone # Fax #    Coreen Mike [phone] [address] [facility] [address]  [facility] [address]        Equal Access to Services     MERLY BARBOZA : Hadii aad ku hadasho Solindseyali, waaxda luqadaha, qaybta kaalmada adeegyada, jess martinezn kati gallardo. So [facility] [phone].    ATENCIÓN: Si habla español, tiene a eduardo disposición servicios gratuitos de asistencia lingüística. Zoëame al [phone].    We comply with applicable federal civil rights laws and [place] laws. We do not discriminate on the basis of race, color, national origin, age, disability, sex, sexual orientation, or gender identity.            Thank you!     Thank you for choosing [facility]  for your care. Our goal is always to provide you with excellent care. Hearing back from our patients is one way we can continue to improve our services. Please take a few minutes to complete the written survey that you may receive  in the mail after your visit with us. Thank you!             Your Updated Medication List - Protect others around you: Learn how to safely use, store and throw away your medicines at www.disposemymeds.org.          This list is accurate as of 10/8/18  1:56 PM.  Always use your most recent med list.                   Brand Name Dispense Instructions for use Diagnosis    calcium carbonate 500 mg (elemental) 500 MG tablet    OS-FABIOLA     Take 500 mg by mouth daily        cephALEXin 500 MG capsule    KEFLEX    21 capsule    Take 1 capsule (500 mg) by mouth 3 times daily for 7 days    Left flank pain       COENZYME Q-10 PO           FISH OIL PO      1 PO QD        HYDROcodone-acetaminophen 5-325 MG per tablet    NORCO    20 tablet    Take 1 tablet by mouth every 4 hours as needed for breakthrough pain, pain or moderate to severe pain    Left flank pain       MULTIVITAMIN TABS   OR     30    one tab daily        vitamin D 1000 units capsule      Take 1 capsule by mouth daily.

## 2018-10-11 ENCOUNTER — DOCUMENTATION ONLY (OUTPATIENT)
Dept: LAB | Facility: CLINIC | Age: 53
End: 2018-10-11

## 2018-10-11 DIAGNOSIS — R73.03 PRE-DIABETES: Primary | ICD-10-CM

## 2018-10-11 DIAGNOSIS — Z00.00 ENCOUNTER FOR ROUTINE ADULT HEALTH EXAMINATION WITHOUT ABNORMAL FINDINGS: ICD-10-CM

## 2018-10-11 NOTE — PROGRESS NOTES
Please place or confirm orders for upcoming lab appointment on 10/18/2018 patient coming in for pre-visit labs    Thank You  Astrid SOUSA CMA.

## 2018-10-22 DIAGNOSIS — R73.03 PRE-DIABETES: ICD-10-CM

## 2018-10-22 DIAGNOSIS — Z00.00 ENCOUNTER FOR ROUTINE ADULT HEALTH EXAMINATION WITHOUT ABNORMAL FINDINGS: ICD-10-CM

## 2018-10-22 LAB
CHOLEST SERPL-MCNC: 184 MG/DL
GLUCOSE SERPL-MCNC: 112 MG/DL (ref 70–99)
HBA1C MFR BLD: 6.3 % (ref 0–5.6)
HDLC SERPL-MCNC: 76 MG/DL
LDLC SERPL CALC-MCNC: 94 MG/DL
NONHDLC SERPL-MCNC: 108 MG/DL
TRIGL SERPL-MCNC: 70 MG/DL

## 2018-10-22 PROCEDURE — 36415 COLL VENOUS BLD VENIPUNCTURE: CPT | Performed by: FAMILY MEDICINE

## 2018-10-22 PROCEDURE — 80061 LIPID PANEL: CPT | Performed by: FAMILY MEDICINE

## 2018-10-22 PROCEDURE — 82947 ASSAY GLUCOSE BLOOD QUANT: CPT | Performed by: FAMILY MEDICINE

## 2018-10-22 PROCEDURE — 83036 HEMOGLOBIN GLYCOSYLATED A1C: CPT | Performed by: FAMILY MEDICINE

## 2018-10-25 ENCOUNTER — OFFICE VISIT (OUTPATIENT)
Dept: FAMILY MEDICINE | Facility: CLINIC | Age: 53
End: 2018-10-25
Payer: COMMERCIAL

## 2018-10-25 VITALS
DIASTOLIC BLOOD PRESSURE: 78 MMHG | SYSTOLIC BLOOD PRESSURE: 119 MMHG | HEIGHT: 64 IN | BODY MASS INDEX: 23.05 KG/M2 | RESPIRATION RATE: 16 BRPM | OXYGEN SATURATION: 100 % | HEART RATE: 59 BPM | TEMPERATURE: 98.2 F | WEIGHT: 135 LBS

## 2018-10-25 DIAGNOSIS — Z11.4 SCREENING FOR HIV (HUMAN IMMUNODEFICIENCY VIRUS): ICD-10-CM

## 2018-10-25 DIAGNOSIS — Z78.0 MENOPAUSE: ICD-10-CM

## 2018-10-25 DIAGNOSIS — R73.03 PRE-DIABETES: ICD-10-CM

## 2018-10-25 DIAGNOSIS — Z12.31 VISIT FOR SCREENING MAMMOGRAM: ICD-10-CM

## 2018-10-25 DIAGNOSIS — Z00.00 ENCOUNTER FOR ROUTINE ADULT HEALTH EXAMINATION WITHOUT ABNORMAL FINDINGS: Primary | ICD-10-CM

## 2018-10-25 DIAGNOSIS — Z23 NEED FOR SHINGLES VACCINE: ICD-10-CM

## 2018-10-25 PROCEDURE — 90750 HZV VACC RECOMBINANT IM: CPT | Performed by: FAMILY MEDICINE

## 2018-10-25 PROCEDURE — 99396 PREV VISIT EST AGE 40-64: CPT | Mod: 25 | Performed by: FAMILY MEDICINE

## 2018-10-25 PROCEDURE — 90471 IMMUNIZATION ADMIN: CPT | Performed by: FAMILY MEDICINE

## 2018-10-25 NOTE — PATIENT INSTRUCTIONS
I will message you on [employer] about urologists you can go to for a second opinion.     Please call [facility] (formerly [facility]) at [phone] to schedule your DEXA scan (bone scan) and mammogram.     Preventive Health Recommendations  Female Ages 50 - 64    Yearly exam: See your health care provider every year in order to  o Review health changes.   o Discuss preventive care.    o Review your medicines if your doctor has prescribed any.      Get a Pap test every three years (unless you have an abnormal result and your provider advises testing more often).    If you get Pap tests with HPV test, you only need to test every 5 years, unless you have an abnormal result.     You do not need a Pap test if your uterus was removed (hysterectomy) and you have not had cancer.    You should be tested each year for STDs (sexually transmitted diseases) if you're at risk.     Have a mammogram every 1 to 2 years.    Have a colonoscopy at age 50, or have a yearly FIT test (stool test). These exams screen for colon cancer.      Have a cholesterol test every 5 years, or more often if advised.    Have a diabetes test (fasting glucose) every three years. If you are at risk for diabetes, you should have this test more often.     If you are at risk for osteoporosis (brittle bone disease), think about having a bone density scan (DEXA).    Shots: Get a flu shot each year. Get a tetanus shot every 10 years.    Nutrition:     Eat at least 5 servings of fruits and vegetables each day.    Eat whole-grain bread, whole-wheat pasta and brown rice instead of white grains and rice.    Get adequate Calcium and Vitamin D.     Lifestyle    Exercise at least 150 minutes a week (30 minutes a day, 5 days a week). This will help you control your weight and prevent disease.    Limit alcohol to one drink per day.    No smoking.     Wear sunscreen to prevent skin cancer.     See your dentist every  six months for an exam and cleaning.    See your eye doctor every 1 to 2 years.

## 2018-10-25 NOTE — MR AVS SNAPSHOT
After Visit Summary   10/25/2018    Sudhakar Zarate    MRN: [id number]           Patient Information     Date Of Birth          1965        Visit Information        Provider Department      10/25/2018 10:40 AM Coreen Mike MD [facility]        Today's Diagnoses     Encounter for routine adult health examination without abnormal findings    -  1    Visit for screening mammogram        Screening for HIV (human immunodeficiency virus)        Pre-diabetes        Menopause          Care Instructions    I will message you on MyChart about urologists you can go to for a second opinion.     Please call [facility] (formerly [facility]) at [phone] to schedule your DEXA scan (bone scan) and mammogram.     Preventive Health Recommendations  Female Ages 50 - 64    Yearly exam: See your health care provider every year in order to  o Review health changes.   o Discuss preventive care.    o Review your medicines if your doctor has prescribed any.      Get a Pap test every three years (unless you have an abnormal result and your provider advises testing more often).    If you get Pap tests with HPV test, you only need to test every 5 years, unless you have an abnormal result.     You do not need a Pap test if your uterus was removed (hysterectomy) and you have not had cancer.    You should be tested each year for STDs (sexually transmitted diseases) if you're at risk.     Have a mammogram every 1 to 2 years.    Have a colonoscopy at age 50, or have a yearly FIT test (stool test). These exams screen for colon cancer.      Have a cholesterol test every 5 years, or more often if advised.    Have a diabetes test (fasting glucose) every three years. If you are at risk for diabetes, you should have this test more often.     If you are at risk for osteoporosis (brittle bone disease), think about having a bone density scan  (DEXA).    Shots: Get a flu shot each year. Get a tetanus shot every 10 years.    Nutrition:     Eat at least 5 servings of fruits and vegetables each day.    Eat whole-grain bread, whole-wheat pasta and brown rice instead of white grains and rice.    Get adequate Calcium and Vitamin D.     Lifestyle    Exercise at least 150 minutes a week (30 minutes a day, 5 days a week). This will help you control your weight and prevent disease.    Limit alcohol to one drink per day.    No smoking.     Wear sunscreen to prevent skin cancer.     See your dentist every six months for an exam and cleaning.    See your eye doctor every 1 to 2 years.            Follow-ups after your visit        Follow-up notes from your care team     Return in about 6 months (around 4/25/2019).      Your next 10 appointments already scheduled     Apr 08, 2019  1:00 PM CDT   CT ABDOMEN PELVIS W/O CONTRAST with MGCT1   [facility]    [address] [place] [id number] [phone]           How do I prepare for my exam? (Food and drink instructions) No Food and Drink Restrictions.  How do I prepare for my exam? (Other instructions) You do not need to do anything special to prepare for this exam. For a sinus scan: Use your nose spray (nasal decongestant spray) as directed.  What should I wear: Please wear loose clothing, such as a sweat suit or jogging clothes. Avoid snaps, zippers and other metal. We may ask you to undress and put on a hospital gown.  How long does the exam take: Most scans take less than 20 minutes.  What should I bring: Please bring any scans or X-rays taken at other hospitals, if similar tests were done. Also bring a list of your medicines, including vitamins, minerals and over-the-counter drugs. It is safest to leave personal items at home.  Do I need a : No  is needed.  What do I need to tell my doctor? Be sure to tell your doctor: * If you have any  allergies. * If there s any chance you are pregnant. * If you are breastfeeding.  What should I do after the exam: No restrictions, you may resume normal activities.  What is this test: A CT (computed tomography) scan is a series of pictures that allows us to look inside your body. The scanner creates images of the body in cross sections, much like slices of bread. This helps us see any problems more clearly.  Who should I call with questions: If you have any questions, please call the Imaging Department where you will have your exam. Directions, parking instructions, and other information are available on our website, [email or web address].            Apr 08, 2019  1:30 PM CDT   Return Visit with Grady Espinoza MD   [facility]    [address] [place] [id number] [phone]              Future tests that were ordered for you today     Open Future Orders        Priority Expected Expires Ordered    MA SCREENING DIGITAL BILAT - Future  (s+30) Routine  10/25/2019 10/25/2018    HIV Screening Routine  10/25/2019 10/25/2018    DX Hip/Pelvis/Spine Routine  10/25/2019 10/25/2018            Who to contact     If you have questions or need follow up information about today's clinic visit or your schedule please contact [facility] directly at [phone].  Normal or non-critical lab and imaging results will be communicated to you by MyChart, letter or phone within 4 business days after the clinic has received the results. If you do not hear from us within 7 days, please contact the clinic through MyChart or phone. If you have a critical or abnormal lab result, we will notify you by phone as soon as possible.  Submit refill requests through [employer] or call your pharmacy and they will forward the refill request to us. Please allow 3 business days for your refill to be completed.          Additional Information About Your Visit        [employer]  "Information     Fam gives you secure access to your electronic health record. If you see a primary care provider, you can also send messages to your care team and make appointments. If you have questions, please call your primary care clinic.  If you do not have a primary care provider, please call [phone] and they will assist you.        Care EveryWhere ID     This is your Care EveryWhere ID. This could be used by other organizations to access your [place] medical records  [id number]        Your Vitals Were     Pulse Temperature Respirations Height Last Period Pulse Oximetry    59 98.2  F (36.8  C) (Oral) 16 1.635 m (5' 4.37\") 04/10/2015 (Approximate) 100%    BMI (Body Mass Index)                   22.91 kg/m2            Blood Pressure from Last 3 Encounters:   10/25/18 119/78   10/08/18 123/81   10/04/18 112/78    Weight from Last 3 Encounters:   10/25/18 61.2 kg (135 lb)   10/04/18 64.4 kg (142 lb)   08/13/18 61.2 kg (135 lb)               Primary Care Provider Office Phone # Fax #    Coreen Mike [phone] [address] [facility] [address]        Equal Access to Services     MERLY BARBOZA : Hadii aad ku hadasho Soomaali, waaxda luqadaha, qaybta kaalmada adeegyada, waxay idiin haybarbn kati lopez . So [facility] [phone].    ATENCIÓN: Si habla español, tiene a eduardo disposición servicios gratuitos de asistencia lingüística. Llame al [phone].    We comply with applicable federal civil rights laws and [place] laws. We do not discriminate on the basis of race, color, national origin, age, disability, sex, sexual orientation, or gender identity.            Thank you!     Thank you for choosing [facility]  for your care. Our goal is always to provide you with excellent care. Hearing back from our patients is one way we can continue to improve our services. Please take a few minutes to complete the written survey that you may receive in " the mail after your visit with us. Thank you!             Your Updated Medication List - Protect others around you: Learn how to safely use, store and throw away your medicines at www.disposemymeds.org.          This list is accurate as of 10/25/18 11:14 AM.  Always use your most recent med list.                   Brand Name Dispense Instructions for use Diagnosis    COENZYME Q-10 PO           FISH OIL PO      1 PO QD        HYDROcodone-acetaminophen 5-325 MG per tablet    NORCO    20 tablet    Take 1 tablet by mouth every 4 hours as needed for breakthrough pain, pain or moderate to severe pain    Left flank pain       vitamin D 1000 units capsule      Take 1 capsule by mouth daily.

## 2018-10-25 NOTE — NURSING NOTE
Screening Questionnaire for Adult Immunization    Are you sick today?   No   Do you have allergies to medications, food, a vaccine component or latex?   No   Have you ever had a serious reaction after receiving a vaccination?   No   Do you have a long-term health problem with heart disease, lung disease, asthma, kidney disease, metabolic disease (e.g. diabetes), anemia, or other blood disorder?   No   Do you have cancer, leukemia, HIV/AIDS, or any other immune system problem?   No   In the past 3 months, have you taken medications that affect  your immune system, such as prednisone, other steroids, or anticancer drugs; drugs for the treatment of rheumatoid arthritis, Crohn s disease, or psoriasis; or have you had radiation treatments?   No   Have you had a seizure, or a brain or other nervous system problem?   No   During the past year, have you received a transfusion of blood or blood     products, or been given immune (gamma) globulin or antiviral drug?   No   For women: Are you pregnant or is there a chance you could become        pregnant during the next month?   No   Have you received any vaccinations in the past 4 weeks?   No     Immunization questionnaire answers were all negative.    Patient instructed to remain in clinic for 15 minutes afterwards, and to report any adverse reaction to me immediately.       Screening performed by Lissy Lay on 10/25/2018 at 11:19 AM.

## 2018-10-26 ENCOUNTER — MYC MEDICAL ADVICE (OUTPATIENT)
Dept: FAMILY MEDICINE | Facility: CLINIC | Age: 53
End: 2018-10-26

## 2018-10-29 ENCOUNTER — HOSPITAL ENCOUNTER (OUTPATIENT)
Dept: MAMMOGRAPHY | Facility: CLINIC | Age: 53
End: 2018-10-29
Attending: FAMILY MEDICINE
Payer: COMMERCIAL

## 2018-10-29 ENCOUNTER — HOSPITAL ENCOUNTER (OUTPATIENT)
Dept: BONE DENSITY | Facility: CLINIC | Age: 53
Discharge: HOME | End: 2018-10-29
Attending: FAMILY MEDICINE | Admitting: FAMILY MEDICINE
Payer: COMMERCIAL

## 2018-10-29 DIAGNOSIS — Z78.0 MENOPAUSE: ICD-10-CM

## 2018-10-29 DIAGNOSIS — Z12.31 VISIT FOR SCREENING MAMMOGRAM: ICD-10-CM

## 2018-10-29 PROCEDURE — 77067 SCR MAMMO BI INCL CAD: CPT

## 2018-10-29 PROCEDURE — 77080 DXA BONE DENSITY AXIAL: CPT

## 2019-03-12 ENCOUNTER — ALLIED HEALTH/NURSE VISIT (OUTPATIENT)
Dept: NURSING | Facility: CLINIC | Age: 54
End: 2019-03-12
Payer: COMMERCIAL

## 2019-03-12 DIAGNOSIS — Z23 NEED FOR SHINGLES VACCINE: Primary | ICD-10-CM

## 2019-03-12 PROCEDURE — 99207 ZZC NO CHARGE NURSE ONLY: CPT

## 2019-03-12 PROCEDURE — 90471 IMMUNIZATION ADMIN: CPT

## 2019-03-12 PROCEDURE — 90750 HZV VACC RECOMBINANT IM: CPT

## 2019-03-12 NOTE — NURSING NOTE
Screening Questionnaire for Adult Immunization    Are you sick today?   No   Do you have allergies to medications, food, a vaccine component or latex?   No   Have you ever had a serious reaction after receiving a vaccination?  Yes, not serious but reported had reaction to first shingrix. Patient went ahead and still completed 2nd dose.    Do you have a long-term health problem with heart disease, lung disease, asthma, kidney disease, metabolic disease (e.g. diabetes), anemia, or other blood disorder?   No   Do you have cancer, leukemia, HIV/AIDS, or any other immune system problem?   No   In the past 3 months, have you taken medications that affect  your immune system, such as prednisone, other steroids, or anticancer drugs; drugs for the treatment of rheumatoid arthritis, Crohn s disease, or psoriasis; or have you had radiation treatments?   No   Have you had a seizure, or a brain or other nervous system problem?   No   During the past year, have you received a transfusion of blood or blood     products, or been given immune (gamma) globulin or antiviral drug?   No   For women: Are you pregnant or is there a chance you could become        pregnant during the next month?   No   Have you received any vaccinations in the past 4 weeks?   No     Immunization questionnaire answers were all negative.        Patient instructed to remain in clinic for 15 minutes afterwards, and to report any adverse reaction to me immediately.       Screening performed by Lissy Lay on 3/12/2019 at 3:07 PM.

## 2019-04-15 ENCOUNTER — HOSPITAL ENCOUNTER (OUTPATIENT)
Dept: CT IMAGING | Facility: CLINIC | Age: 54
Discharge: HOME | End: 2019-04-15
Attending: UROLOGY | Admitting: UROLOGY
Payer: COMMERCIAL

## 2019-04-15 DIAGNOSIS — N20.0 NEPHROLITHIASIS: ICD-10-CM

## 2019-04-15 PROCEDURE — 74176 CT ABD & PELVIS W/O CONTRAST: CPT

## 2019-04-16 ENCOUNTER — ANCILLARY PROCEDURE (OUTPATIENT)
Dept: GENERAL RADIOLOGY | Facility: CLINIC | Age: 54
End: 2019-04-16
Attending: UROLOGY
Payer: COMMERCIAL

## 2019-04-16 ENCOUNTER — OFFICE VISIT (OUTPATIENT)
Dept: UROLOGY | Facility: CLINIC | Age: 54
End: 2019-04-16
Payer: COMMERCIAL

## 2019-04-16 VITALS — SYSTOLIC BLOOD PRESSURE: 122 MMHG | RESPIRATION RATE: 16 BRPM | DIASTOLIC BLOOD PRESSURE: 84 MMHG | HEART RATE: 60 BPM

## 2019-04-16 DIAGNOSIS — N20.0 NEPHROLITHIASIS: Primary | ICD-10-CM

## 2019-04-16 PROCEDURE — 74019 RADEX ABDOMEN 2 VIEWS: CPT

## 2019-04-16 PROCEDURE — 99214 OFFICE O/P EST MOD 30 MIN: CPT | Performed by: UROLOGY

## 2019-04-16 NOTE — PROGRESS NOTES
S: Patient is a pleasant 54-year-old female who was seen today for a consultation with regard to patient's renal stones.  She was seen last year with flank pain and gross hematuria.  She had a CT scan done that showed 3 left renal stones.  She denies any flank pain or gross hematuria.  Recent CT scan showed again 3 left renal stones.  The maximal dimension is 3 mm in size.  She has no history of kidney stones or treatments in the past.  Current Outpatient Medications   Medication Sig Dispense Refill     Cholecalciferol (VITAMIN D) 1000 UNIT capsule Take 1 capsule by mouth daily.       COENZYME Q-10 PO        FISH OIL OR 1 PO QD       HYDROcodone-acetaminophen (NORCO) 5-325 MG per tablet Take 1 tablet by mouth every 4 hours as needed for breakthrough pain, pain or moderate to severe pain (Patient not taking: Reported on 2019) 20 tablet 0     No Known Allergies  Past Medical History:   Diagnosis Date     ASCUS of cervix with negative high risk HPV 2016 ASCUS/Neg HPV. Plan: cotest in 3 years. Due by 19     Female infertility of unspecified origin      Flank pain     following uti. CT abd and urol w/u neg     GERD (gastroesophageal reflux disease) 2012     Mammographic microcalcification     s/p bx : benign breast parenchyma     Spontaneous      x 1, VIP x1     Past Surgical History:   Procedure Laterality Date     C ANESTH, SECTION       COLONOSCOPY WITH CO2 INSUFFLATION N/A 2017    Procedure: COLONOSCOPY WITH CO2 INSUFFLATION;  Surgeon: Duane, William Charles, MD;  Location:  OR      DILATION/CURETTAGE DIAG/THER NON OB      following incomplete ab      Family History   Problem Relation Age of Onset     C.A.D. Mother         started later 40's or early 50s'.      Diabetes Mother      Hypertension Mother      Coronary Artery Disease Mother      Hyperlipidemia Mother      Cancer Father         lung, former smoker     Other Cancer Father         Lung      Diabetes Brother      Diabetes Sister         borderline diabetes     Thyroid Cancer Sister         caught early stage.      Social History     Socioeconomic History     Marital status:      Spouse name: None     Number of children: 1     Years of education: None     Highest education level: None   Occupational History     Occupation:  - IT     Employer: [employer]   Social Needs     Financial resource strain: None     Food insecurity:     Worry: None     Inability: None     Transportation needs:     Medical: None     Non-medical: None   Tobacco Use     Smoking status: Never Smoker     Smokeless tobacco: Never Used   Substance and Sexual Activity     Alcohol use: Yes     Comment: rare     Drug use: No     Sexual activity: Yes     Partners: Male   Lifestyle     Physical activity:     Days per week: None     Minutes per session: None     Stress: None   Relationships     Social connections:     Talks on phone: None     Gets together: None     Attends Anabaptism service: None     Active member of club or organization: None     Attends meetings of clubs or organizations: None     Relationship status: None     Intimate partner violence:     Fear of current or ex partner: None     Emotionally abused: None     Physically abused: None     Forced sexual activity: None   Other Topics Concern      Service No     Blood Transfusions No     Caffeine Concern No     Occupational Exposure No     Hobby Hazards No     Sleep Concern No     Stress Concern No     Weight Concern No     Special Diet No     Back Care No     Exercise Yes     Comment: 6 times per week and plays tennis     Bike Helmet No     Seat Belt Yes     Self-Exams No     Parent/sibling w/ CABG, MI or angioplasty before 65F 55M? No   Social History Narrative    Works as  with [employer]     with one daughter    Immigrant from [place] in 2000    [employer]ism: [employer]               REVIEW OF  SYSTEMS  =================  C: NEGATIVE for fever, chills, change in weight  I: NEGATIVE for worrisome rashes, moles or lesions  E/M: NEGATIVE for ear, mouth and throat problems  R: NEGATIVE for significant cough or SHORTNESS OF BREATH  CV:  NEGATIVE for chest pain, palpitations or peripheral edema  GI: NEGATIVE for nausea, abdominal pain, heartburn, or change in bowel habits  NEURO: NEGATIVE numbness/weakness  : see HPI  PSYCH: NEGATIVE depression/anxiety  LYmph: no new enlarged lymph nodes  Ortho: no new trauma/movements      Physical Exam:  /84 (BP Location: Right arm, Patient Position: Chair, Cuff Size: Adult Regular)   Pulse 60   Resp 16   LMP 04/10/2015 (Approximate)    Patient is pleasant, in no acute distress, good general condition.  Heart:  negative, PMI normal  Lung: no evidence of respiratory distress    Abdomen: Soft, nondistended, non tender. No masses. No rebound or guarding.   Exam: No CVA tenderness  Skin: Warm and dry.  No redness.  Neuro: grossly normal  Musculaskeletal: moving all extremities  Psych normal mood and affect  Musculoskeletal  moving all extremities  Hematologic/Lymphatic/Immunologic: normal ant/post cervical, axillary, supraclavicular and inguinal nodes    Assessment/Plan:   Pleasant 54-year-old female with left renal stones.  She is not symptomatic from it at this time.  Treatment options discussed with patient at length.  This includes but not limited to observation versus surgical intervention.  Pros and cons discussed at length.  I mainly discussed extracorporal shockwave lithotripsy with patient giving location of the stones.  Information about procedure given.  The reported success rate is about 80%.  If she is interested in treatment I can schedule for it in the near future otherwise I will see patient back in the clinic in 1 year from now with a KUB.  I will schedule her for a KUB today to see what I can see the stones.

## 2019-08-30 PROBLEM — M25.562 LEFT KNEE PAIN: Status: RESOLVED | Noted: 2018-08-29 | Resolved: 2019-08-30

## 2019-08-30 NOTE — PROGRESS NOTES
Discharge Note    Progress reporting period is from last progress note on   to Sep 7, 2018.    Bin failed to follow up and current status is unknown.  Please see information below for last relevant information on current status.  Patient seen for 3 visits.    SUBJECTIVE  Subjective changes noted by patient:  Continues to do well but notes minor popliteal pain.  .  Current pain level is  .     Previous pain level was  3/10.   Changes in function:  Yes (See Goal flowsheet attached for changes in current functional level)  Adverse reaction to treatment or activity: None    OBJECTIVE  Changes noted in objective findings: See flow.     ASSESSMENT/PLAN  Diagnosis: L knee pain   Updated problem list and treatment plan:   Pain - HEP  STG/LTGs have been met or progress has been made towards goals:  Yes, please see goal flowsheet for most current information  Assessment of Progress: current status is unknown.    Last current status: Pt is progressing well   Self Management Plans:  HEP  I have re-evaluated this patient and find that the nature, scope, duration and intensity of the therapy is appropriate for the medical condition of the patient.  Bin continues to require the following intervention to meet STG and LTG's:  HEP.    Recommendations:  Discharge with current home program.  Patient to follow up with MD as needed.    Please refer to the daily flowsheet for treatment today, total treatment time and time spent performing 1:1 timed codes.

## 2019-09-05 ENCOUNTER — MYC MEDICAL ADVICE (OUTPATIENT)
Dept: FAMILY MEDICINE | Facility: CLINIC | Age: 54
End: 2019-09-05

## 2019-09-27 ASSESSMENT — ENCOUNTER SYMPTOMS
WEAKNESS: 0
MYALGIAS: 0
HEMATOCHEZIA: 0
HEARTBURN: 0
BREAST MASS: 0
DIZZINESS: 0
DIARRHEA: 0
EYE PAIN: 0
CONSTIPATION: 0
FREQUENCY: 0
FEVER: 0
DYSURIA: 0
PARESTHESIAS: 0
HEMATURIA: 0
JOINT SWELLING: 0
COUGH: 0
ARTHRALGIAS: 0
CHILLS: 0
HEADACHES: 0
SHORTNESS OF BREATH: 0
NERVOUS/ANXIOUS: 0
SORE THROAT: 0
PALPITATIONS: 0
ABDOMINAL PAIN: 0
NAUSEA: 0

## 2019-09-30 ENCOUNTER — OFFICE VISIT (OUTPATIENT)
Dept: FAMILY MEDICINE | Facility: CLINIC | Age: 54
End: 2019-09-30
Payer: COMMERCIAL

## 2019-09-30 VITALS
OXYGEN SATURATION: 98 % | SYSTOLIC BLOOD PRESSURE: 129 MMHG | HEART RATE: 61 BPM | HEIGHT: 65 IN | RESPIRATION RATE: 16 BRPM | TEMPERATURE: 98.8 F | DIASTOLIC BLOOD PRESSURE: 82 MMHG | BODY MASS INDEX: 22.82 KG/M2 | WEIGHT: 137 LBS

## 2019-09-30 DIAGNOSIS — Z12.4 SCREENING FOR CERVICAL CANCER: ICD-10-CM

## 2019-09-30 DIAGNOSIS — Z23 NEED FOR PROPHYLACTIC VACCINATION AND INOCULATION AGAINST INFLUENZA: ICD-10-CM

## 2019-09-30 DIAGNOSIS — Z78.0 MENOPAUSE: ICD-10-CM

## 2019-09-30 DIAGNOSIS — R73.03 PRE-DIABETES: ICD-10-CM

## 2019-09-30 DIAGNOSIS — Z11.4 SCREENING FOR HIV (HUMAN IMMUNODEFICIENCY VIRUS): ICD-10-CM

## 2019-09-30 DIAGNOSIS — N39.0 RECURRENT UTI: ICD-10-CM

## 2019-09-30 DIAGNOSIS — Z00.00 ENCOUNTER FOR ROUTINE ADULT HEALTH EXAMINATION WITHOUT ABNORMAL FINDINGS: Primary | ICD-10-CM

## 2019-09-30 LAB
ANION GAP SERPL CALCULATED.3IONS-SCNC: 6 MMOL/L (ref 3–14)
BUN SERPL-MCNC: 11 MG/DL (ref 7–30)
CALCIUM SERPL-MCNC: 9.7 MG/DL (ref 8.5–10.1)
CHLORIDE SERPL-SCNC: 105 MMOL/L (ref 94–109)
CHOLEST SERPL-MCNC: 173 MG/DL
CO2 SERPL-SCNC: 30 MMOL/L (ref 20–32)
CREAT SERPL-MCNC: 0.62 MG/DL (ref 0.52–1.04)
GFR SERPL CREATININE-BSD FRML MDRD: >90 ML/MIN/{1.73_M2}
GLUCOSE SERPL-MCNC: 99 MG/DL (ref 70–99)
HBA1C MFR BLD: 6.1 % (ref 0–5.6)
HDLC SERPL-MCNC: 75 MG/DL
HIV 1+2 AB+HIV1 P24 AG SERPL QL IA: NONREACTIVE
LDLC SERPL CALC-MCNC: 90 MG/DL
NONHDLC SERPL-MCNC: 98 MG/DL
POTASSIUM SERPL-SCNC: 3.6 MMOL/L (ref 3.4–5.3)
SODIUM SERPL-SCNC: 141 MMOL/L (ref 133–144)
SPECIMEN SOURCE: NORMAL
TRIGL SERPL-MCNC: 42 MG/DL
WET PREP SPEC: NORMAL

## 2019-09-30 PROCEDURE — 90471 IMMUNIZATION ADMIN: CPT | Performed by: FAMILY MEDICINE

## 2019-09-30 PROCEDURE — 83036 HEMOGLOBIN GLYCOSYLATED A1C: CPT | Performed by: FAMILY MEDICINE

## 2019-09-30 PROCEDURE — G0145 SCR C/V CYTO,THINLAYER,RESCR: HCPCS | Performed by: FAMILY MEDICINE

## 2019-09-30 PROCEDURE — 80048 BASIC METABOLIC PNL TOTAL CA: CPT | Performed by: FAMILY MEDICINE

## 2019-09-30 PROCEDURE — 87389 HIV-1 AG W/HIV-1&-2 AB AG IA: CPT | Performed by: FAMILY MEDICINE

## 2019-09-30 PROCEDURE — 99396 PREV VISIT EST AGE 40-64: CPT | Mod: 25 | Performed by: FAMILY MEDICINE

## 2019-09-30 PROCEDURE — 87624 HPV HI-RISK TYP POOLED RSLT: CPT | Performed by: FAMILY MEDICINE

## 2019-09-30 PROCEDURE — 80061 LIPID PANEL: CPT | Performed by: FAMILY MEDICINE

## 2019-09-30 PROCEDURE — 36415 COLL VENOUS BLD VENIPUNCTURE: CPT | Performed by: FAMILY MEDICINE

## 2019-09-30 PROCEDURE — 99213 OFFICE O/P EST LOW 20 MIN: CPT | Mod: 25 | Performed by: FAMILY MEDICINE

## 2019-09-30 PROCEDURE — 90686 IIV4 VACC NO PRSV 0.5 ML IM: CPT | Performed by: FAMILY MEDICINE

## 2019-09-30 PROCEDURE — 87210 SMEAR WET MOUNT SALINE/INK: CPT | Performed by: FAMILY MEDICINE

## 2019-09-30 RX ORDER — NITROFURANTOIN 25; 75 MG/1; MG/1
CAPSULE ORAL
COMMUNITY
Start: 2019-09-28 | End: 2020-12-30

## 2019-09-30 RX ORDER — SULFAMETHOXAZOLE/TRIMETHOPRIM 800-160 MG
1 TABLET ORAL 2 TIMES DAILY
Qty: 14 TABLET | Refills: 0 | Status: SHIPPED | OUTPATIENT
Start: 2019-09-30 | End: 2020-11-25

## 2019-09-30 ASSESSMENT — ENCOUNTER SYMPTOMS
FEVER: 0
ARTHRALGIAS: 0
PARESTHESIAS: 0
CHILLS: 0
NERVOUS/ANXIOUS: 0
NAUSEA: 0
BREAST MASS: 0
PALPITATIONS: 0
HEADACHES: 0
HEMATURIA: 0
DIARRHEA: 0
ABDOMINAL PAIN: 0
SHORTNESS OF BREATH: 0
CONSTIPATION: 0
JOINT SWELLING: 0
HEMATOCHEZIA: 0
MYALGIAS: 0
HEARTBURN: 0
DIZZINESS: 0
FREQUENCY: 0
EYE PAIN: 0
DYSURIA: 0
SORE THROAT: 0
WEAKNESS: 0
COUGH: 0

## 2019-09-30 ASSESSMENT — PAIN SCALES - GENERAL: PAINLEVEL: NO PAIN (1)

## 2019-09-30 ASSESSMENT — MIFFLIN-ST. JEOR: SCORE: 1219.37

## 2019-09-30 NOTE — PROGRESS NOTES
wet   SUBJECTIVE:   CC: Sudhakar Zarate is an 54 year old woman who presents for preventive health visit.     Healthy Habits:     Getting at least 3 servings of Calcium per day:  Yes    Bi-annual eye exam:  Yes    Dental care twice a year:  Yes    Sleep apnea or symptoms of sleep apnea:  None    Diet:  Other    Frequency of exercise:  4-5 days/week    Duration of exercise:  45-60 minutes    Taking medications regularly:  Yes    Medication side effects:  Not applicable    PHQ-2 Total Score: 0    Additional concerns today:  Yes    URINARY TRACT INFECTION sx's again two days ago- did virtual visit and started on macrobid. Also hx of uti one month ago. Urgency and suprapubic tenderness is improving but not yet resolved (has had 3 doses so far of macrobid). No fever, chills, flank pain    Still has kidney stones- Follows with Dr. Leon to monitor. 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 Declines typhoid vaccine    Occasional thirst and wonders if she has developed diabetes as she has been borderline in the past.         Today's PHQ-2 Score:   PHQ-2 ( 1999 Pfizer) 9/27/2019   Q1: Little interest or pleasure in doing things 0   Q2: Feeling down, depressed or hopeless 0   PHQ-2 Score 0   Q1: Little interest or pleasure in doing things Not at all   Q2: Feeling down, depressed or hopeless Not at all   PHQ-2 Score 0       Abuse: Current or Past(Physical, Sexual or Emotional)- No  Do you feel safe in your environment? Yes    Social History     Tobacco Use     Smoking status: Never Smoker     Smokeless tobacco: Never Used   Substance Use Topics     Alcohol use: Yes     Comment: rare         Alcohol Use 9/27/2019   Prescreen: >3 drinks/day or >7 drinks/week? Not Applicable   Prescreen: >3 drinks/day or >7 drinks/week? -   No flowsheet data found.    Reviewed orders with patient.  Reviewed health maintenance and updated orders accordingly - Yes  Patient Active Problem List   Diagnosis     History of infertility,  female     [place]     CARDIOVASCULAR SCREENING; LDL GOAL LESS THAN 160     Uterine fibroid     Pre-diabetes     ASCUS of cervix with negative high risk HPV     Past Surgical History:   Procedure Laterality Date     C ANESTH, SECTION       COLONOSCOPY WITH CO2 INSUFFLATION N/A 2017    Procedure: COLONOSCOPY WITH CO2 INSUFFLATION;  Surgeon: Duane, William Charles, MD;  Location:  OR     HC DILATION/CURETTAGE DIAG/THER NON OB      following incomplete ab       Social History     Tobacco Use     Smoking status: Never Smoker     Smokeless tobacco: Never Used   Substance Use Topics     Alcohol use: Yes     Comment: rare     Family History   Problem Relation Age of Onset     C.A.D. Mother         started later 40's or early 50s'.      Diabetes Mother      Hypertension Mother      Coronary Artery Disease Mother      Hyperlipidemia Mother      Cancer Father         lung, former smoker     Other Cancer Father         Lung     Diabetes Brother      Diabetes Sister         borderline diabetes     Thyroid Cancer Sister         caught early stage.          Current Outpatient Medications   Medication Sig Dispense Refill     Cholecalciferol (VITAMIN D) 1000 UNIT capsule Take 1 capsule by mouth daily.       COENZYME Q-10 PO        FISH OIL OR 1 PO QD       nitroFURantoin macrocrystal-monohydrate (MACROBID) 100 MG capsule        sulfamethoxazole-trimethoprim (BACTRIM DS/SEPTRA DS) 800-160 MG tablet Take 1 tablet by mouth 2 times daily 14 tablet 0     HYDROcodone-acetaminophen (NORCO) 5-325 MG per tablet Take 1 tablet by mouth every 4 hours as needed for breakthrough pain, pain or moderate to severe pain 20 tablet 0     No Known Allergies    Mammogram Screening: Patient over age 50, mutual decision to screen reflected in health maintenance.    Pertinent mammograms are reviewed under the imaging tab.  History of abnormal Pap smear: YES - updated in Problem List and Health Maintenance accordingly  PAP / HPV Latest Ref Rng &  "Units 12/2/2016 9/19/2013 9/30/2010   PAP - ASC-US(A) NIL NIL   HPV 16 DNA NEG Negative - -   HPV 18 DNA NEG Negative - -   OTHER HR HPV NEG Negative - -     Reviewed and updated as needed this visit by clinical staff  Tobacco  Allergies  Meds  Med Hx  Surg Hx  Fam Hx  Soc Hx        Reviewed and updated as needed this visit by Provider            Review of Systems   Constitutional: Negative for chills and fever.   HENT: Negative for congestion, ear pain, hearing loss and sore throat.    Eyes: Negative for pain and visual disturbance.   Respiratory: Negative for cough and shortness of breath.    Cardiovascular: Negative for chest pain, palpitations and peripheral edema.   Gastrointestinal: Negative for abdominal pain, constipation, diarrhea, heartburn, hematochezia and nausea.   Breasts:  Negative for tenderness, breast mass and discharge.   Genitourinary: Negative for dysuria, frequency, genital sores, hematuria, pelvic pain, urgency, vaginal bleeding and vaginal discharge.   Musculoskeletal: Negative for arthralgias, joint swelling and myalgias.   Skin: Negative for rash.   Neurological: Negative for dizziness, weakness, headaches and paresthesias.   Psychiatric/Behavioral: Negative for mood changes. The patient is not nervous/anxious.           OBJECTIVE:   /82 (BP Location: Right arm, Patient Position: Chair, Cuff Size: Adult Regular)   Pulse 61   Temp 98.8  F (37.1  C) (Oral)   Resp 16   Ht 1.638 m (5' 4.5\")   Wt 62.1 kg (137 lb)   LMP 04/10/2015 (Approximate)   SpO2 98%   Breastfeeding? No   BMI 23.15 kg/m    Physical Exam  GENERAL APPEARANCE: healthy, alert and no distress  EYES: Eyes grossly normal to inspection, PERRL and conjunctivae and sclerae normal  HENT: ear canals and TM's normal, nose and mouth without ulcers or lesions, oropharynx clear and oral mucous membranes moist  NECK: no adenopathy, no asymmetry, masses, or scars and thyroid normal to palpation  RESP: lungs clear to " auscultation - no rales, rhonchi or wheezes  BREAST: normal without masses, tenderness or nipple discharge and no palpable axillary masses or adenopathy  CV: regular rate and rhythm, normal S1 S2, no S3 or S4, no murmur, click or rub, no peripheral edema and peripheral pulses strong  ABDOMEN: soft, nontender, no hepatosplenomegaly, no masses and bowel sounds normal   (female): normal female external genitalia, normal urethral meatus, vaginal mucosal atrophy noted, normal cervix, adnexae, and uterus without masses or abnormal discharge  MS: no musculoskeletal defects are noted and gait is age appropriate without ataxia  SKIN: no suspicious lesions or rashes  NEURO: Normal strength and tone, sensory exam grossly normal, mentation intact and speech normal  PSYCH: mentation appears normal and affect normal/bright    Mild right adnexal tenderness    Diagnostic Test Results:  none     ASSESSMENT/PLAN:   1. Encounter for routine adult health examination without abnormal findings  - Lipid panel reflex to direct LDL Fasting  - Pap imaged thin layer screen with HPV - recommended age 30 - 65 years (select HPV order below)  - HPV High Risk Types DNA Cervical  - Wet prep    2. Recurrent UTI  I have given her a prescription of an antibiotic to carry with her to [place] in case of emergency.  We will plan to have her check the urine after treatment for UTI to ensure the infection is completely gone and no persistent hematuria indicating that stones could be contributing  - UA reflex to Microscopic and Culture; Future  - sulfamethoxazole-trimethoprim (BACTRIM DS/SEPTRA DS) 800-160 MG tablet; Take 1 tablet by mouth 2 times daily  Dispense: 14 tablet; Refill: 0  - Basic metabolic panel    3. Screening for HIV (human immunodeficiency virus)  - HIV Screening    4. Pre-diabetes  She acknowledges she needs to make improvement in her dietary choices for diabetes prevention.  Adjust plan based on labs.  - Basic metabolic panel  - Hemoglobin  "A1c    5. Menopause    6. Screening for cervical cancer  - Pap imaged thin layer screen with HPV - recommended age 30 - 65 years (select HPV order below)  - HPV High Risk Types DNA Cervical    7. Need for prophylactic vaccination and inoculation against influenza  - INFLUENZA VACCINE IM > 6 MONTHS VALENT IIV4 [16292]  - Vaccine Administration, Initial [18566]    COUNSELING:  Reviewed preventive health counseling, as reflected in patient instructions       Regular exercise       Healthy diet/nutrition       Vision screening       Hearing screening       Osteoporosis Prevention/Bone Health       Colon cancer screening       HIV screeninx in teen years, 1x in adult years, and at intervals if high risk       Advance Care Planning    Estimated body mass index is 22.91 kg/m  as calculated from the following:    Height as of 10/25/18: 1.635 m (5' 4.37\").    Weight as of 10/25/18: 61.2 kg (135 lb).         reports that she has never smoked. She has never used smokeless tobacco.      Counseling Resources:  ATP IV Guidelines  Pooled Cohorts Equation Calculator  Breast Cancer Risk Calculator  FRAX Risk Assessment  ICSI Preventive Guidelines  Dietary Guidelines for Americans,   USDA's MyPlate  ASA Prophylaxis  Lung CA Screening    Coreen Mike MD  [facility]  "

## 2019-10-02 ENCOUNTER — TELEPHONE (OUTPATIENT)
Dept: FAMILY MEDICINE | Facility: CLINIC | Age: 54
End: 2019-10-02

## 2019-10-02 NOTE — TELEPHONE ENCOUNTER
Reason for Call:  Other     Detailed comments: patient was 9/30/2019 and she was to take medication for a UTI, and the last pill will be tomorrow morning. Patient was told she needed to come in to take a test and she going out of town on Friday the the 4th and asking when should she come in to the test?    Phone Number Patient can be reached at: Home number on file [phone] (home)    Best Time: any    Can we leave a detailed message on this number? YES    Call taken on 10/2/2019 at 9:03 AM by Anali Ramírez

## 2019-10-02 NOTE — TELEPHONE ENCOUNTER
Per chart review, patient has future lab order for UA.    2. Recurrent UTI  I have given her a prescription of an antibiotic to carry with her to [place] in case of emergency.  We will plan to have her check the urine after treatment for UTI to ensure the infection is completely gone and no persistent hematuria indicating that stones could be contributing  - UA reflex to Microscopic and Culture; Future  - sulfamethoxazole-trimethoprim (BACTRIM DS/SEPTRA DS) 800-160 MG tablet; Take 1 tablet by mouth 2 times daily  Dispense: 14 tablet; Refill: 0  - Basic metabolic panel      Her last dose of ABX is tomorrow.  She believes her symptoms of UTI are gone. Patient is aware that needs to be done with ABX when will come in for UA.  Because she is leaving out of town on Friday, will make a lab apt at [place] location around 4:00 pm.  She is coming back from her trip on October 12th.    Jada Moran RN

## 2019-10-03 DIAGNOSIS — N39.0 RECURRENT UTI: ICD-10-CM

## 2019-10-03 LAB
ALBUMIN UR-MCNC: NEGATIVE MG/DL
APPEARANCE UR: CLEAR
BACTERIA #/AREA URNS HPF: ABNORMAL /HPF
BILIRUB UR QL STRIP: NEGATIVE
COLOR UR AUTO: YELLOW
GLUCOSE UR STRIP-MCNC: 100 MG/DL
HGB UR QL STRIP: NEGATIVE
KETONES UR STRIP-MCNC: 15 MG/DL
LEUKOCYTE ESTERASE UR QL STRIP: ABNORMAL
NITRATE UR QL: NEGATIVE
NON-SQ EPI CELLS #/AREA URNS LPF: ABNORMAL /LPF
PH UR STRIP: 5 PH (ref 5–7)
RBC #/AREA URNS AUTO: ABNORMAL /HPF
SOURCE: ABNORMAL
SP GR UR STRIP: 1.02 (ref 1–1.03)
UROBILINOGEN UR STRIP-ACNC: 0.2 EU/DL (ref 0.2–1)
WBC #/AREA URNS AUTO: ABNORMAL /HPF

## 2019-10-03 PROCEDURE — 81001 URINALYSIS AUTO W/SCOPE: CPT | Performed by: FAMILY MEDICINE

## 2019-10-03 PROCEDURE — 87086 URINE CULTURE/COLONY COUNT: CPT | Performed by: FAMILY MEDICINE

## 2019-10-04 LAB
COPATH REPORT: NORMAL
PAP: NORMAL

## 2019-10-05 LAB
BACTERIA SPEC CULT: NO GROWTH
SPECIMEN SOURCE: NORMAL

## 2019-10-08 LAB
FINAL DIAGNOSIS: NORMAL
HPV HR 12 DNA CVX QL NAA+PROBE: NEGATIVE
HPV16 DNA SPEC QL NAA+PROBE: NEGATIVE
HPV18 DNA SPEC QL NAA+PROBE: NEGATIVE
SPECIMEN DESCRIPTION: NORMAL
SPECIMEN SOURCE CVX/VAG CYTO: NORMAL

## 2020-01-07 ENCOUNTER — OFFICE VISIT (OUTPATIENT)
Dept: FAMILY MEDICINE | Facility: CLINIC | Age: 55
End: 2020-01-07
Payer: COMMERCIAL

## 2020-01-07 VITALS
HEIGHT: 64 IN | TEMPERATURE: 98.3 F | BODY MASS INDEX: 23.73 KG/M2 | HEART RATE: 62 BPM | OXYGEN SATURATION: 99 % | DIASTOLIC BLOOD PRESSURE: 88 MMHG | WEIGHT: 139 LBS | SYSTOLIC BLOOD PRESSURE: 136 MMHG | RESPIRATION RATE: 16 BRPM

## 2020-01-07 DIAGNOSIS — N89.8 VAGINAL DISCHARGE: ICD-10-CM

## 2020-01-07 DIAGNOSIS — R73.03 PRE-DIABETES: ICD-10-CM

## 2020-01-07 DIAGNOSIS — R35.0 URINARY FREQUENCY: Primary | ICD-10-CM

## 2020-01-07 DIAGNOSIS — R30.0 DYSURIA: ICD-10-CM

## 2020-01-07 DIAGNOSIS — R10.9 LEFT FLANK PAIN: ICD-10-CM

## 2020-01-07 DIAGNOSIS — R39.15 URINARY URGENCY: ICD-10-CM

## 2020-01-07 LAB
ALBUMIN UR-MCNC: NEGATIVE MG/DL
ANION GAP SERPL CALCULATED.3IONS-SCNC: 3 MMOL/L (ref 3–14)
APPEARANCE UR: CLEAR
BILIRUB UR QL STRIP: NEGATIVE
BUN SERPL-MCNC: 14 MG/DL (ref 7–30)
CALCIUM SERPL-MCNC: 10.1 MG/DL (ref 8.5–10.1)
CHLORIDE SERPL-SCNC: 104 MMOL/L (ref 94–109)
CO2 SERPL-SCNC: 31 MMOL/L (ref 20–32)
COLOR UR AUTO: YELLOW
CREAT SERPL-MCNC: 0.52 MG/DL (ref 0.52–1.04)
GFR SERPL CREATININE-BSD FRML MDRD: >90 ML/MIN/{1.73_M2}
GLUCOSE SERPL-MCNC: 107 MG/DL (ref 70–99)
GLUCOSE UR STRIP-MCNC: NEGATIVE MG/DL
HBA1C MFR BLD: 6.3 % (ref 0–5.6)
HGB UR QL STRIP: ABNORMAL
KETONES UR STRIP-MCNC: NEGATIVE MG/DL
LEUKOCYTE ESTERASE UR QL STRIP: ABNORMAL
NITRATE UR QL: NEGATIVE
NON-SQ EPI CELLS #/AREA URNS LPF: NORMAL /LPF
PH UR STRIP: 7 PH (ref 5–7)
POTASSIUM SERPL-SCNC: 3.8 MMOL/L (ref 3.4–5.3)
RBC #/AREA URNS AUTO: NORMAL /HPF
SODIUM SERPL-SCNC: 138 MMOL/L (ref 133–144)
SOURCE: ABNORMAL
SP GR UR STRIP: 1.01 (ref 1–1.03)
SPECIMEN SOURCE: NORMAL
UROBILINOGEN UR STRIP-ACNC: 0.2 EU/DL (ref 0.2–1)
WBC #/AREA URNS AUTO: NORMAL /HPF
WET PREP SPEC: NORMAL

## 2020-01-07 PROCEDURE — 80048 BASIC METABOLIC PNL TOTAL CA: CPT | Performed by: FAMILY MEDICINE

## 2020-01-07 PROCEDURE — 36415 COLL VENOUS BLD VENIPUNCTURE: CPT | Performed by: FAMILY MEDICINE

## 2020-01-07 PROCEDURE — 87086 URINE CULTURE/COLONY COUNT: CPT | Performed by: FAMILY MEDICINE

## 2020-01-07 PROCEDURE — 99214 OFFICE O/P EST MOD 30 MIN: CPT | Performed by: FAMILY MEDICINE

## 2020-01-07 PROCEDURE — 81001 URINALYSIS AUTO W/SCOPE: CPT | Performed by: FAMILY MEDICINE

## 2020-01-07 PROCEDURE — 87210 SMEAR WET MOUNT SALINE/INK: CPT | Performed by: FAMILY MEDICINE

## 2020-01-07 PROCEDURE — 83036 HEMOGLOBIN GLYCOSYLATED A1C: CPT | Performed by: FAMILY MEDICINE

## 2020-01-07 RX ORDER — FLUCONAZOLE 150 MG/1
150 TABLET ORAL ONCE
Qty: 2 TABLET | Refills: 0 | Status: SHIPPED | OUTPATIENT
Start: 2020-01-07 | End: 2020-01-07

## 2020-01-07 RX ORDER — HYDROCODONE BITARTRATE AND ACETAMINOPHEN 5; 325 MG/1; MG/1
1 TABLET ORAL EVERY 4 HOURS PRN
Qty: 20 TABLET | Refills: 0 | Status: SHIPPED | OUTPATIENT
Start: 2020-01-07 | End: 2020-12-30

## 2020-01-07 ASSESSMENT — MIFFLIN-ST. JEOR: SCORE: 1220.5

## 2020-01-07 NOTE — PROGRESS NOTES
"Subjective     Sudhakar Zarate is a 54 year old female who presents to clinic today for the following health issues:    HPI   URINARY TRACT SYMPTOMS  Onset: 1 month now     Description:   Painful urination (Dysuria): YES- some            Frequency: YES  Blood in urine (Hematuria): no, but discolored urine - \"dark urine\".   Delay in urine (Hesitency): no     Intensity: mild    Progression of Symptoms:  constant    Accompanying Signs & Symptoms:  Fever/chills: no   Flank pain no   Nausea and vomiting: no   Any vaginal symptoms: vaginal discharge  Abdominal/Pelvic Pain: no     History:   History of frequent UTI's: YES  History of kidney stones: YES  Sexually Active: YES  Possibility of pregnancy: No. Last period was in 2015.    Therapies Tried and outcome: She was seen via [facility] and currently treated with Macrobid.   -In the past when she has had UTIs it was resolved with Bactrim. The symptoms she currently has doesn't feel the same because urgency has been intermittent and persistent for about one month.     - she initally treated her sx's with home bactrim Rx she had left over from [facility]. This did not resolve sx's. She then did a [facility] visit and was given Macrobid. She is currently on her 3rd dose (takes 1 capsule bid x 5 days. Her symptoms haven't changed since starting Macrobid.     -She has some vaginal discomfort that she has a hard time describing. Has experienced some vaginal discharge and some burning in the inside of her vagina   -Pt showed me a picture of urine output she had in the morning several days ago, it was a very dark brown urine. This was 2 days before she started macrobid. Since starting the abx and when drinking water throughout the day her urine gets lighter.      -Physically she has been feeling okay. Denies fever, chills, changes in energy, abdominal pain, flank pain, breathing issues, edema         Patient Active Problem List   Diagnosis     History of infertility, female     N[place]     " "CARDIOVASCULAR SCREENING; LDL GOAL LESS THAN 160     Uterine fibroid     Pre-diabetes     ASCUS of cervix with negative high risk HPV     Past Surgical History:   Procedure Laterality Date     C ANESTH, SECTION       COLONOSCOPY WITH CO2 INSUFFLATION N/A 2017    Procedure: COLONOSCOPY WITH CO2 INSUFFLATION;  Surgeon: Duane, William Charles, MD;  Location:  OR      DILATION/CURETTAGE DIAG/THER NON OB      following incomplete ab       Social History     Tobacco Use     Smoking status: Never Smoker     Smokeless tobacco: Never Used   Substance Use Topics     Alcohol use: Yes     Comment: rare     Family History   Problem Relation Age of Onset     C.A.D. Mother         started later 40's or early 50s'.      Diabetes Mother      Hypertension Mother      Coronary Artery Disease Mother      Hyperlipidemia Mother      Cancer Father         lung, former smoker     Other Cancer Father         Lung     Diabetes Brother      Diabetes Sister         borderline diabetes     Thyroid Cancer Sister         caught early stage.              Reviewed and updated as needed this visit by Provider         Review of Systems   ROS COMP: Constitutional, HEENT, cardiovascular, pulmonary, gi and gu systems are negative, except as otherwise noted.    This document serves as a record of the services and decisions personally performed by ROLAND GOMEZ. It was created on his/her behalf by Jelena Herrera, a trained medical scribe. The creation of this document is based on the provider's statements to the medical scribe. Jelena Herrera, 2020 2:44 PM      Objective    /88 (BP Location: Right arm, Patient Position: Sitting, Cuff Size: Adult Regular)   Pulse 62   Temp 98.3  F (36.8  C) (Oral)   Resp 16   Ht 1.626 m (5' 4\")   Wt 63 kg (139 lb)   LMP 04/10/2015 (Approximate)   SpO2 99%   BMI 23.86 kg/m    Body mass index is 23.86 kg/m .  Physical Exam   GENERAL: healthy, alert and no " distress  ABDOMEN: soft, nontender, no hepatosplenomegaly, no masses and bowel sounds normal  BACK: no CVA tenderness, no paralumbar tenderness   (female): mild redness of left labia, normal urethral meatus, normal cervix/adnexa/uterus without masses, mild tenderness of right adnexa on internal exam, moderate white milky discharge present    PSYCH: mentation appears normal, affect normal/bright    04/15/2019 CT Abdomen Pelvis  IMPRESSION: Again identified are three nonobstructing stones in the  left kidney.     KYLE CASTILLO MD    Results for orders placed or performed in visit on 01/07/20   UA reflex to Microscopic and Culture     Status: Abnormal   Result Value Ref Range    Color Urine Yellow     Appearance Urine Clear     Glucose Urine Negative NEG^Negative mg/dL    Bilirubin Urine Negative NEG^Negative    Ketones Urine Negative NEG^Negative mg/dL    Specific Gravity Urine 1.015 1.003 - 1.035    Blood Urine Trace (A) NEG^Negative    pH Urine 7.0 5.0 - 7.0 pH    Protein Albumin Urine Negative NEG^Negative mg/dL    Urobilinogen Urine 0.2 0.2 - 1.0 EU/dL    Nitrite Urine Negative NEG^Negative    Leukocyte Esterase Urine Trace (A) NEG^Negative    Source Midstream Urine    Urine Microscopic     Status: None   Result Value Ref Range    WBC Urine 0 - 5 OTO5^0 - 5 /HPF    RBC Urine O - 2 OTO2^O - 2 /HPF    Squamous Epithelial /LPF Urine Few FEW^Few /LPF   Wet prep     Status: None   Result Value Ref Range    Specimen Description Vagina     Wet Prep No Trichomonas seen     Wet Prep No clue cells seen     Wet Prep No yeast seen     Wet Prep Many  WBC'S seen                Assessment & Plan       ICD-10-CM    1. Urinary frequency R35.0 UA reflex to Microscopic and Culture     Urine Microscopic     Urine Culture Aerobic Bacterial     Basic metabolic panel     Hemoglobin A1c   2. Urinary urgency R39.15 Urine Culture Aerobic Bacterial   3. Dysuria R30.0 Urine Culture Aerobic Bacterial   4. Vaginal discharge N89.8 Wet prep      fluconazole (DIFLUCAN) 150 MG tablet   5. Pre-diabetes R73.03 Basic metabolic panel     Hemoglobin A1c   6. Left flank pain R10.9 HYDROcodone-acetaminophen (NORCO) 5-325 MG tablet      Continue course of macrobid for URINARY TRACT INFECTION as ua looks bland today.  Wet prep negative for yeast, however pelvic exam and hx is suspicious of a yeast infection. Start diflucan. Reviewed timing of taking, onset, benefits, monitoring and typical and severe AE of the medication. Will culture urine also.   Will r/o renal dysfx given the brown urine and check glucose levels given the frequency of urination. Consider also cbc/lft's if recurs.   F/u with urology if persistent sx's. Consider also vaginal estrogen as menopausal x several years now.     Patient requests refill of norco to have on hand when she travels in case of kidney stone. Reports rx for 10 tabs given in 2018 has now . We reviewed dropping that rx off with [facility] [employer] drop. No red flags for narcotic abuse.       Patient Instructions     Continue macrobid. Start diflucan.     At [facility], we strive to deliver an exceptional experience to you, every time we see you. If you receive a survey, please complete it as we do value your feedback.  If you have MyChart, you can expect to receive results automatically within 24 hours of their completion.  Your provider will send a note interpreting your results as well.   If you do not have MyChart, you should receive your results in about a week by mail.    Your care team:     Family Medicine   DEO Magallon MD Emily Bunt, APRN CNP   S. MD Christianne Mcdonald MD Angela Wermerskirchen, MD    Internal Medicine  Scott Duncan MD coming      Clinic hours: Monday - Wednesday 7 am-7 pm    and  7 am-5 pm.     [facility]: Monday - Friday 11 am-9 pm,   Saturday and  9 am-5 pm.    [facility]: Monday -Thursday 8 am-8 pm; Friday 8 am-6 pm; Saturday and Sunday 9 am-5 pm.     [facility]: Monday - Thursday 8 am - 7 pm; Friday 8 am - 6 pm    Clinic: [phone]   [facility]: [phone] m [facility]: [phone]              Return in about 9 months (around 9/30/2020) for Physical Exam and as needed if symptoms worsen or fail to improve. .    The information in this document, created by the medical scribe for me, accurately reflects the services I personally performed and the decisions made by me. I have reviewed and approved this document for accuracy.   Coreen Mike MD  [facility]

## 2020-01-07 NOTE — PATIENT INSTRUCTIONS
Continue macrobid. Start diflucan.     At [facility], we strive to deliver an exceptional experience to you, every time we see you. If you receive a survey, please complete it as we do value your feedback.  If you have MyChart, you can expect to receive results automatically within 24 hours of their completion.  Your provider will send a note interpreting your results as well.   If you do not have MyChart, you should receive your results in about a week by mail.    Your care team:     Family Medicine   DEO Magallon MD Emily Bunt, ISMAEL WHALEN   S. MD Christianne Mcdonald MD Angela Wermerskirchen, MD    Internal Medicine  Scott Duncan MD coming 2020     Clinic hours: Monday - Wednesday 7 am-7 pm   Thursdays and Fridays 7 am-5 pm.     [facility]: Monday - Friday 11 am-9 pm,   Saturday and Sunday 9 am-5 pm.    [facility]: Monday -Thursday 8 am-8 pm; Friday 8 am-6 pm; Saturday and Sunday 9 am-5 pm.     [facility]: Monday - Thursday 8 am - 7 pm; Friday 8 am - 6 pm    Clinic: [phone]   [facility]: [phone] m [facility]: [phone]

## 2020-01-08 LAB
BACTERIA SPEC CULT: NO GROWTH
SPECIMEN SOURCE: NORMAL

## 2020-01-09 ENCOUNTER — MYC MEDICAL ADVICE (OUTPATIENT)
Dept: FAMILY MEDICINE | Facility: CLINIC | Age: 55
End: 2020-01-09

## 2020-01-09 DIAGNOSIS — N20.0 NEPHROLITHIASIS: Primary | ICD-10-CM

## 2020-01-09 DIAGNOSIS — N39.0 RECURRENT UTI: ICD-10-CM

## 2020-01-10 DIAGNOSIS — N20.0 NEPHROLITHIASIS: ICD-10-CM

## 2020-01-10 NOTE — TELEPHONE ENCOUNTER
Referral letter created by Dr. Mike faxed to Dr James, [facility], fax # [phone].    Giana Guzmán RT / TC

## 2020-11-16 ENCOUNTER — HEALTH MAINTENANCE LETTER (OUTPATIENT)
Age: 55
End: 2020-11-16

## 2020-11-19 DIAGNOSIS — Z12.31 VISIT FOR SCREENING MAMMOGRAM: ICD-10-CM

## 2020-11-24 ENCOUNTER — MYC MEDICAL ADVICE (OUTPATIENT)
Dept: FAMILY MEDICINE | Facility: CLINIC | Age: 55
End: 2020-11-24

## 2020-11-24 DIAGNOSIS — N20.0 NEPHROLITHIASIS: Primary | ICD-10-CM

## 2020-11-24 DIAGNOSIS — E28.39 ESTROGEN DEFICIENCY: ICD-10-CM

## 2020-11-24 DIAGNOSIS — M85.851 OSTEOPENIA OF BOTH HIPS: ICD-10-CM

## 2020-11-24 DIAGNOSIS — M85.852 OSTEOPENIA OF BOTH HIPS: ICD-10-CM

## 2020-12-21 ENCOUNTER — HOSPITAL ENCOUNTER (OUTPATIENT)
Dept: BONE DENSITY | Facility: CLINIC | Age: 55
End: 2020-12-21
Attending: FAMILY MEDICINE
Payer: COMMERCIAL

## 2020-12-21 ENCOUNTER — HOSPITAL ENCOUNTER (OUTPATIENT)
Dept: GENERAL RADIOLOGY | Facility: CLINIC | Age: 55
End: 2020-12-21
Attending: FAMILY MEDICINE
Payer: COMMERCIAL

## 2020-12-21 DIAGNOSIS — M85.852 OSTEOPENIA OF BOTH HIPS: ICD-10-CM

## 2020-12-21 DIAGNOSIS — N20.0 NEPHROLITHIASIS: ICD-10-CM

## 2020-12-21 DIAGNOSIS — E28.39 ESTROGEN DEFICIENCY: ICD-10-CM

## 2020-12-21 DIAGNOSIS — M85.851 OSTEOPENIA OF BOTH HIPS: ICD-10-CM

## 2020-12-21 PROCEDURE — 77080 DXA BONE DENSITY AXIAL: CPT

## 2020-12-21 PROCEDURE — 74019 RADEX ABDOMEN 2 VIEWS: CPT

## 2020-12-29 ASSESSMENT — ENCOUNTER SYMPTOMS
DYSURIA: 0
ARTHRALGIAS: 0
ABDOMINAL PAIN: 0
JOINT SWELLING: 0
CHILLS: 0
PARESTHESIAS: 0
HEADACHES: 0
COUGH: 0
NERVOUS/ANXIOUS: 0
BREAST MASS: 0
SORE THROAT: 0
FEVER: 0
SHORTNESS OF BREATH: 0
HEMATOCHEZIA: 0
HEARTBURN: 0
CONSTIPATION: 0
EYE PAIN: 0
FREQUENCY: 0
MYALGIAS: 0
HEMATURIA: 0
NAUSEA: 0
DIARRHEA: 0
DIZZINESS: 0
PALPITATIONS: 0
WEAKNESS: 0

## 2020-12-30 ENCOUNTER — OFFICE VISIT (OUTPATIENT)
Dept: FAMILY MEDICINE | Facility: CLINIC | Age: 55
End: 2020-12-30
Payer: COMMERCIAL

## 2020-12-30 VITALS
DIASTOLIC BLOOD PRESSURE: 82 MMHG | SYSTOLIC BLOOD PRESSURE: 116 MMHG | HEART RATE: 63 BPM | BODY MASS INDEX: 23.92 KG/M2 | OXYGEN SATURATION: 99 % | TEMPERATURE: 97.9 F | HEIGHT: 64 IN | WEIGHT: 140.13 LBS

## 2020-12-30 DIAGNOSIS — M85.852 OSTEOPENIA OF BOTH HIPS: ICD-10-CM

## 2020-12-30 DIAGNOSIS — N20.0 NEPHROLITHIASIS: ICD-10-CM

## 2020-12-30 DIAGNOSIS — Z20.822 EXPOSURE TO COVID-19 VIRUS: ICD-10-CM

## 2020-12-30 DIAGNOSIS — M85.851 OSTEOPENIA OF BOTH HIPS: ICD-10-CM

## 2020-12-30 DIAGNOSIS — Z11.59 NEED FOR HEPATITIS C SCREENING TEST: ICD-10-CM

## 2020-12-30 DIAGNOSIS — Z13.220 SCREENING FOR LIPID DISORDERS: ICD-10-CM

## 2020-12-30 DIAGNOSIS — Z00.00 ENCOUNTER FOR ROUTINE ADULT HEALTH EXAMINATION WITHOUT ABNORMAL FINDINGS: Primary | ICD-10-CM

## 2020-12-30 DIAGNOSIS — R73.03 PRE-DIABETES: ICD-10-CM

## 2020-12-30 LAB
CHOLEST SERPL-MCNC: 208 MG/DL
GLUCOSE SERPL-MCNC: 102 MG/DL (ref 70–99)
HBA1C MFR BLD: 6.3 % (ref 0–5.6)
HDLC SERPL-MCNC: 90 MG/DL
LDLC SERPL CALC-MCNC: 106 MG/DL
NONHDLC SERPL-MCNC: 118 MG/DL
PTH-INTACT SERPL-MCNC: 29 PG/ML (ref 18–80)
TRIGL SERPL-MCNC: 59 MG/DL

## 2020-12-30 PROCEDURE — 86803 HEPATITIS C AB TEST: CPT | Performed by: FAMILY MEDICINE

## 2020-12-30 PROCEDURE — 86769 SARS-COV-2 COVID-19 ANTIBODY: CPT | Performed by: FAMILY MEDICINE

## 2020-12-30 PROCEDURE — 83036 HEMOGLOBIN GLYCOSYLATED A1C: CPT | Performed by: FAMILY MEDICINE

## 2020-12-30 PROCEDURE — 36415 COLL VENOUS BLD VENIPUNCTURE: CPT | Performed by: FAMILY MEDICINE

## 2020-12-30 PROCEDURE — 99396 PREV VISIT EST AGE 40-64: CPT | Performed by: FAMILY MEDICINE

## 2020-12-30 PROCEDURE — 83970 ASSAY OF PARATHORMONE: CPT | Performed by: FAMILY MEDICINE

## 2020-12-30 PROCEDURE — 80061 LIPID PANEL: CPT | Performed by: FAMILY MEDICINE

## 2020-12-30 PROCEDURE — 82947 ASSAY GLUCOSE BLOOD QUANT: CPT | Performed by: FAMILY MEDICINE

## 2020-12-30 PROCEDURE — 82306 VITAMIN D 25 HYDROXY: CPT | Performed by: FAMILY MEDICINE

## 2020-12-30 RX ORDER — ASPIRIN 81 MG/1
TABLET ORAL
COMMUNITY
Start: 2020-10-20 | End: 2022-08-23

## 2020-12-30 ASSESSMENT — ENCOUNTER SYMPTOMS
MYALGIAS: 0
JOINT SWELLING: 0
NAUSEA: 0
PALPITATIONS: 0
DIZZINESS: 0
SHORTNESS OF BREATH: 0
DIARRHEA: 0
FEVER: 0
ARTHRALGIAS: 0
BREAST MASS: 0
EYE PAIN: 0
FREQUENCY: 0
WEAKNESS: 0
PARESTHESIAS: 0
NERVOUS/ANXIOUS: 0
SORE THROAT: 0
ABDOMINAL PAIN: 0
HEARTBURN: 0
CHILLS: 0
DYSURIA: 0
HEMATURIA: 0
CONSTIPATION: 0
HEMATOCHEZIA: 0
COUGH: 0
HEADACHES: 0

## 2020-12-30 ASSESSMENT — PAIN SCALES - GENERAL: PAINLEVEL: NO PAIN (0)

## 2020-12-30 ASSESSMENT — MIFFLIN-ST. JEOR: SCORE: 1220.6

## 2020-12-30 NOTE — PATIENT INSTRUCTIONS
Aim to get about 1,200 mg of calcium per day.   Take calcium with food, split dose if > 600 mg.

## 2020-12-30 NOTE — PROGRESS NOTES
SUBJECTIVE:   CC: Sudhakar Zarate is an 55 year old woman who presents for preventive health visit.       Patient has been advised of split billing requirements and indicates understanding: Yes  Healthy Habits:     Getting at least 3 servings of Calcium per day:  Yes    Bi-annual eye exam:  Yes    Dental care twice a year:  Yes    Sleep apnea or symptoms of sleep apnea:  None    Diet:  Diabetic    Frequency of exercise:  4-5 days/week    Duration of exercise:  45-60 minutes    Taking medications regularly:  Yes    Medication side effects:  None    PHQ-2 Total Score: 0    Additional concerns today:  Yes    Gained 10 lbs but now getting it back off.     Flu vaccine in [employer] in  Oct.     Recent KUB showed resolution of previously seen stones in the kidney.  Her last CT scan was of January this year with her urologist which is out of the system.  I am unable to see the scan results through care everywhere    Patient was exposed to her daughter and daughter's  in close contact this summer.  2 days after the visit they developed symptoms and tested positive for Covid   She isolated for 14 days but never developed symptoms.  Is interested in getting Covid antibodies to see if she had an asymptomatic infection      Today's PHQ-2 Score:   PHQ-2 ( 1999 Pfizer) 12/29/2020   Q1: Little interest or pleasure in doing things 0   Q2: Feeling down, depressed or hopeless 0   PHQ-2 Score 0   Q1: Little interest or pleasure in doing things Not at all   Q2: Feeling down, depressed or hopeless Not at all   PHQ-2 Score 0       Abuse: Current or Past (Physical, Sexual or Emotional) - No  Do you feel safe in your environment? Yes        Social History     Tobacco Use     Smoking status: Never Smoker     Smokeless tobacco: Never Used   Substance Use Topics     Alcohol use: Yes     Comment: rare     If you drink alcohol do you typically have >3 drinks per day or >7 drinks per week? No    No flowsheet data found.    Reviewed orders with  "patient.  Reviewed health maintenance and updated orders accordingly - Yes      Mammogram Screening: Patient over age 50, mutual decision to screen reflected in health maintenance.    Pertinent mammograms are reviewed under the imaging tab.  History of abnormal Pap smear: NO - age 30-65 PAP every 5 years with negative HPV co-testing recommended  PAP / HPV Latest Ref Rng & Units 9/30/2019 12/2/2016 9/19/2013   PAP - NIL ASC-US(A) NIL   HPV 16 DNA NEG:Negative Negative Negative -   HPV 18 DNA NEG:Negative Negative Negative -   OTHER HR HPV NEG:Negative Negative Negative -     Reviewed and updated as needed this visit by clinical staff  Tobacco  Allergies  Meds   Med Hx  Surg Hx  Fam Hx  Soc Hx        Reviewed and updated as needed this visit by Provider                    Review of Systems   Constitutional: Negative for chills and fever.   HENT: Negative for congestion, ear pain, hearing loss and sore throat.    Eyes: Negative for pain and visual disturbance.   Respiratory: Negative for cough and shortness of breath.    Cardiovascular: Negative for chest pain, palpitations and peripheral edema.   Gastrointestinal: Negative for abdominal pain, constipation, diarrhea, heartburn, hematochezia and nausea.   Breasts:  Negative for tenderness, breast mass and discharge.   Genitourinary: Negative for dysuria, frequency, genital sores, hematuria, pelvic pain, urgency, vaginal bleeding and vaginal discharge.   Musculoskeletal: Negative for arthralgias, joint swelling and myalgias.   Skin: Negative for rash.   Neurological: Negative for dizziness, weakness, headaches and paresthesias.   Psychiatric/Behavioral: Negative for mood changes. The patient is not nervous/anxious.         OBJECTIVE:   /82 (BP Location: Left arm, Patient Position: Sitting, Cuff Size: Adult Regular)   Pulse 63   Temp 97.9  F (36.6  C) (Oral)   Ht 1.626 m (5' 4\")   Wt 63.6 kg (140 lb 2 oz)   LMP 04/10/2015 (Approximate)   SpO2 99%   " Breastfeeding No   BMI 24.05 kg/m    Physical Exam  GENERAL APPEARANCE: healthy, alert and no distress  EYES: Eyes grossly normal to inspection, PERRL and conjunctivae and sclerae normal  HENT: ear canals and TM's normal, nose and mouth without ulcers or lesions, oropharynx clear and oral mucous membranes moist  NECK: no adenopathy, no asymmetry, masses, or scars and thyroid normal to palpation  RESP: lungs clear to auscultation - no rales, rhonchi or wheezes  BREAST: normal without masses, tenderness or nipple discharge and no palpable axillary masses or adenopathy  CV: regular rate and rhythm, normal S1 S2, no S3 or S4, no murmur, click or rub, no peripheral edema and peripheral pulses strong  ABDOMEN: soft, nontender, no hepatosplenomegaly, no masses and bowel sounds normal   (female): normal female external genitalia, normal urethral meatus, vaginal mucosal atrophy noted, normal cervix, adnexae, and uterus without masses or abnormal discharge  MS: no musculoskeletal defects are noted and gait is age appropriate without ataxia  SKIN: no suspicious lesions or rashes  NEURO: Normal strength and tone, sensory exam grossly normal, mentation intact and speech normal  PSYCH: mentation appears normal and affect normal/bright    Diagnostic Test Results:  Labs reviewed in Epic    ASSESSMENT/PLAN:   1. Encounter for routine adult health examination without abnormal findings    2. Need for hepatitis C screening test  - Hepatitis C Screen Reflex to HCV RNA Quant and Genotype    3. Pre-diabetes  She is working on healthy diet and exercise and weight management to manage  - Hemoglobin A1c  - Glucose    4. Osteopenia of both hips  Reviewed her bone density scan T-scores and FRAX score.  Given the low fracture risk right now we will hold off on prescription treatment but did discuss calcium, vitamin D and weightbearing exercises  - Parathyroid Hormone Intact  - Vitamin D Deficiency    5. Screening for lipid disorders  - Lipid  "panel reflex to direct LDL Fasting    6. Exposure to COVID-19 virus  Exposure previous but never developed symptoms.  Would like to screen for asymptomatic infection  - COVID-19 Virus (Coronavirus) Antibody & Titer Reflex    7.  Nephrolithiasis-encouraged her to call urology to see if further follow-up is needed given the stones have resolved off her x-ray.      COUNSELING:  Reviewed preventive health counseling, as reflected in patient instructions       Regular exercise       Healthy diet/nutrition       Osteoporosis prevention/bone health    Estimated body mass index is 24.05 kg/m  as calculated from the following:    Height as of this encounter: 1.626 m (5' 4\").    Weight as of this encounter: 63.6 kg (140 lb 2 oz).        She reports that she has never smoked. She has never used smokeless tobacco.      Counseling Resources:  ATP IV Guidelines  Pooled Cohorts Equation Calculator  Breast Cancer Risk Calculator  BRCA-Related Cancer Risk Assessment: FHS-7 Tool  FRAX Risk Assessment  ICSI Preventive Guidelines  Dietary Guidelines for Americans, 2010  USDA's MyPlate  ASA Prophylaxis  Lung CA Screening    Coreen Mike MD  [facility]  "

## 2020-12-31 LAB
COVID-19 SPIKE RBD ABY TITER: NORMAL
COVID-19 SPIKE RBD ABY: NEGATIVE
DEPRECATED CALCIDIOL+CALCIFEROL SERPL-MC: 56 UG/L (ref 20–75)
HCV AB SERPL QL IA: NONREACTIVE

## 2021-09-18 ENCOUNTER — HEALTH MAINTENANCE LETTER (OUTPATIENT)
Age: 56
End: 2021-09-18

## 2021-10-29 ENCOUNTER — MYC MEDICAL ADVICE (OUTPATIENT)
Dept: FAMILY MEDICINE | Facility: CLINIC | Age: 56
End: 2021-10-29

## 2021-10-29 DIAGNOSIS — N20.0 NEPHROLITHIASIS: Primary | ICD-10-CM

## 2021-11-05 ENCOUNTER — MYC MEDICAL ADVICE (OUTPATIENT)
Dept: FAMILY MEDICINE | Facility: CLINIC | Age: 56
End: 2021-11-05

## 2021-11-24 ENCOUNTER — HOSPITAL ENCOUNTER (OUTPATIENT)
Dept: GENERAL RADIOLOGY | Facility: CLINIC | Age: 56
End: 2021-11-24
Attending: FAMILY MEDICINE
Payer: COMMERCIAL

## 2021-11-24 ENCOUNTER — HOSPITAL ENCOUNTER (OUTPATIENT)
Dept: MAMMOGRAPHY | Facility: CLINIC | Age: 56
End: 2021-11-24
Attending: FAMILY MEDICINE
Payer: COMMERCIAL

## 2021-11-24 DIAGNOSIS — N20.0 NEPHROLITHIASIS: ICD-10-CM

## 2021-11-24 DIAGNOSIS — Z12.31 VISIT FOR SCREENING MAMMOGRAM: ICD-10-CM

## 2021-11-24 PROCEDURE — 77063 BREAST TOMOSYNTHESIS BI: CPT

## 2021-11-24 PROCEDURE — 74019 RADEX ABDOMEN 2 VIEWS: CPT

## 2022-01-17 ENCOUNTER — TELEPHONE (OUTPATIENT)
Dept: FAMILY MEDICINE | Facility: CLINIC | Age: 57
End: 2022-01-17
Payer: COMMERCIAL

## 2022-01-17 NOTE — TELEPHONE ENCOUNTER
Called to reschedule appointment per provider's lunch time. Patient answered and we were able to reschedule.

## 2022-03-05 ENCOUNTER — HEALTH MAINTENANCE LETTER (OUTPATIENT)
Age: 57
End: 2022-03-05

## 2022-07-07 ENCOUNTER — THERAPY VISIT (OUTPATIENT)
Dept: PHYSICAL THERAPY | Facility: CLINIC | Age: 57
End: 2022-07-07
Payer: COMMERCIAL

## 2022-07-07 DIAGNOSIS — M25.511 ACUTE PAIN OF RIGHT SHOULDER: ICD-10-CM

## 2022-07-07 PROCEDURE — 97110 THERAPEUTIC EXERCISES: CPT | Mod: GP | Performed by: PHYSICAL THERAPIST

## 2022-07-07 PROCEDURE — 97161 PT EVAL LOW COMPLEX 20 MIN: CPT | Mod: GP | Performed by: PHYSICAL THERAPIST

## 2022-07-07 PROCEDURE — 97530 THERAPEUTIC ACTIVITIES: CPT | Mod: GP | Performed by: PHYSICAL THERAPIST

## 2022-07-07 NOTE — PROGRESS NOTES
[facility] Initial Evaluation -- Upper Extremity    Evaluation Date: 2022  Sudhakar Zarate is a 57 year old female with a R shoulder condition.   Referral: SELF  Work mechanical stresses: sitting, computer work  Employment status:    Leisure mechanical stresses: tennis  Functional disability score (SPADI):   VAS score (0-10): 2/10  Handedness (R/L):  R  Patient goals/expectations:  To be able to play tennis    HISTORY    Present symptoms: R anterior shoulder.    Pain quality (sharp/shooting/stabbing/aching/burning/cramping):  Aching, sharp    Present since (onset date):  2022    Symptoms (improving/unchanging/worsening):  improving.    Symptoms commenced as a result of: hitting a ball overhead with tennis-- hit it at her with some force, and she hit it back   Condition occurred in the following environment: recreation    Symptoms at onset: R anterior shoulder  Paresthesia (yes/no):  no  Spinal history: no   Cough/Sneeze (pos/neg):  neg    Constant symptoms: none  Intermittent symptoms: R anterior shoulder    Symptoms are worse with the following: putting R hand on steering wheel to drive, reaching up, reaching forward   Symptoms are better with the following: avoiding aggravators    Continued use makes the pain (better/worse/no effect): worse    Disturbed night (yes/no):  no    Pain at rest (yes/no): no  Site (neck/shoulder/elbow/wrist/hand): na    Other questions (swelling/catching/clicking/locking/subluxing):  none    Previous episodes: none  Previous treatments: none    Specific Questions:  General health (excellent/good/fair/poor):  good  Pertinent medical history includes: Osteoporosis  Medications (nil/NSAIDS/analg/steroids/anticoag/other):  NSAIDS  Medical allergies:  none  Imaging (None/Xray/MRI/Other): none  Recent or major surgery (yes/no): no; hx of   Night pain (yes/no): no  Accidents (yes/no): no  Unexplained weight loss (yes/no): no  Barriers at  home: no  Other red flags: no    Sites for physical examination (neck/shoulder/elbow/wrist/hand): R shoulder    EXAMINATION    Posture:  Sitting (good/fair/poor): good  Correction of posture (better/worse/no effect/NA): NA  Standing (good/fair/poor): good  Other observations:  none    Neurological (NA/motor/sensory/reflexes/dural): na    Baselines (pain or functional activity): R shoulder pain midrange abduction, pain resisted abduction    Extremities (Shoulder/Elbow/Wrist/Hand): R shoulder    Movement Loss Salas Mod Min Nil Pain   Flexion    x PDM   Extension    x NE   Abduction    x PDM   Internal Rotation    x NE   External Rotation    x NE   Supination        Pronation        Radial Deviation        Ulnar Deviation           Passive Movement (+/- overpressure)/(PDM/ERP):  PROM R shoulder:  Flexion nil loss, NE overpressure, abduction nil loss, NE overpressure, IR nil loss, NE; ER nil loss NE; extension nil loss, ER stretch  Resisted Test Response (pain): R shoulder strength:  Flexion 5/5, NE; abduction 4+/5 w/pain; IR and ER 5/5 without pain.  Other Tests: min loss R shoulder horizontal adduction AROM/PROM    Spine:  Movement Loss: na  Effect of repeated movements: na  Effect of static positioning: na  Spine testing (not relevant/relevant/secondary problem): not relevant    Baseline Symptoms: R shoulder pain midrange abduction, resisted abduction  Repeated Tests Symptom Response Mechanical Response   Active/Passive movement, resisted test, functional test During - Produce, Abolish, Increase, Decrease, NE After -   Better, Worse, NB, NW, NE Effect -   ? or ? ROM, strength or key functional test No Effect   Rep horiz add w/self-OP No Effect    No Effect     x   Rep horiz abd w/PT OP No Effect    No Effect     x   Rep ext w/PT OP and IR No Effect    Better    X--decr pain abduction, improved abduction strength with decr pain    Rep ext w/hand on counter behind, palm up    Rep ext w/wand, IR No Effect        NE    No  Effect        Better            X--decr pain abduction, improved abduction strength with less pain x   Effect of static positioning                  Provisional Classification (Extremity/Spine): Extremity - Derangement      Principle of Management:  Education:  POC, treatment rationale, expected response, joint derangement, may continue activity as long as not increasing pain/worse  Equipment provided:  none  Exercise and dosage:  Repeated wand extension with IR x10-12 reps, 4x/day to start, increase to 5-6x/day as able; R shoulder extension strength, standing w/ER x20-30 reps, 1x/day    ASSESSMENT/PLAN:    Patient is a 57 year old female with right side shoulder complaints.  Provisional classification of derangement with directional preference for extension.  She had decreased R shoulder pain with abduction after repeated R shoulder extension exercises and will try a home to assess further.  She should make good progress with treatment focusing on directional preference exercises to improve mechanics and decrease pain.  She may also benefit from scapular and rotator cuff strengthening once symptoms resolve to prevent recurrence.       Patient has the following significant findings with corresponding treatment plan.                Diagnosis 1:  R shoulder derangement  Pain -  self management, education, directional preference exercise and home program  Decreased ROM/flexibility - manual therapy, therapeutic exercise and home program  Decreased strength - therapeutic exercise, therapeutic activities and home program  Decreased function - therapeutic activities and home program    Cumulative Therapy Evaluation is: Low complexity.    Previous and current functional limitations:  (See Goal Flow Sheet for this information)    Short term and Long term goals: (See Goal Flow Sheet for this information)     Communication ability:  Patient appears to be able to clearly communicate and understand verbal and written  communication and follow directions correctly.  Treatment Explanation - The following has been discussed with the patient:   RX ordered/plan of care  Anticipated outcomes  Possible risks and side effects  This patient would benefit from PT intervention to resume normal activities.   Rehab potential is good.    Frequency:  2 X a month, once daily  Duration:  for 1.5 months  Discharge Plan:  Achieve all LTG.  Independent in home treatment program.  Reach maximal therapeutic benefit.    Please refer to the daily flowsheet for treatment today, total treatment time and time spent performing 1:1 timed codes.

## 2022-08-23 ENCOUNTER — VIRTUAL VISIT (OUTPATIENT)
Dept: FAMILY MEDICINE | Facility: CLINIC | Age: 57
End: 2022-08-23
Payer: COMMERCIAL

## 2022-08-23 DIAGNOSIS — N20.0 NEPHROLITHIASIS: ICD-10-CM

## 2022-08-23 DIAGNOSIS — M85.851 OSTEOPENIA OF BOTH HIPS: ICD-10-CM

## 2022-08-23 DIAGNOSIS — R73.03 PRE-DIABETES: Primary | ICD-10-CM

## 2022-08-23 DIAGNOSIS — M85.852 OSTEOPENIA OF BOTH HIPS: ICD-10-CM

## 2022-08-23 PROCEDURE — 99214 OFFICE O/P EST MOD 30 MIN: CPT | Mod: GT | Performed by: FAMILY MEDICINE

## 2022-08-23 NOTE — PATIENT INSTRUCTIONS
Dexa scan is due any time after December.   Mammogram is due 11/24/22 or later     Schedule fasting labs    Have a great move to [place]! :)

## 2022-08-23 NOTE — PROGRESS NOTES
Sudhakar is a 57 year old who is being evaluated via a billable video visit.      How would you like to obtain your AVS? MyChart  If the video visit is dropped, the invitation should be resent by: Text to cell phone: [phone]  Will anyone else be joining your video visit? No          Assessment & Plan     Pre-diabetes  Standing a1c, kidney, liver and electrolyte panel, lipid orders in place in chart. Reviewed metformin as option to manage glucose levels. Continue to exercise and eat healthy    Osteopenia of both hips  Reviewed ca/vit d/wt bearing exercises. dexa due this next year     Nephrolithiasis  No recent sx's.            Patient Instructions   Dexa scan is due any time after December.   Mammogram is due 11/24/22 or later     Schedule fasting labs    Have a great move to [place]! :)      Return in about 6 months (around 2/23/2023) for Routine preventive.    Coreen Mike MD  [facility]    Brook De La Fuente is a 57 year old, presenting for the following health issues:  Labs Only      History of Present Illness       Reason for visit:  I am moving to [place] after labor day so I won t be able to make physical on Sep 24. I d like to get some exams done if possible. A1C, Cholesterol, Dexa scan, etc.    She eats 2-3 servings of fruits and vegetables daily.She consumes 0 sweetened beverage(s) daily.She exercises with enough effort to increase her heart rate 30 to 60 minutes per day.  She exercises with enough effort to increase her heart rate 5 days per week.   She is taking medications regularly.     Planning to retire in [place].  just got a new job there.   Will be working remote  Sept 7th will move.     No kidney stones pain. No blood in urine.     Biggest concern is her pre-diabetes. If too many carbs can't sleep well    Tennis, pickle ball, walking    Has skin exam planned with derm. Was getting some sun spots and new rash on her chest.     Keeping wt under 145 lbs.      Some fatigue but only getting 5-7 hours per night. Not sleeping well for years.         Review of Systems   Constitutional, HEENT, cardiovascular, pulmonary, gi and gu systems are negative, except as otherwise noted.      Objective           Vitals:  No vitals were obtained today due to virtual visit.    Physical Exam   GENERAL: Healthy, alert and no distress  EYES: Eyes grossly normal to inspection.  No discharge or erythema, or obvious scleral/conjunctival abnormalities.  RESP: No audible wheeze, cough, or visible cyanosis.  No visible retractions or increased work of breathing.    SKIN: Visible skin clear. No significant rash, abnormal pigmentation or lesions.  NEURO: Cranial nerves grossly intact.  Mentation and speech appropriate for age.  PSYCH: Mentation appears normal, affect normal/bright, judgement and insight intact, normal speech and appearance well-groomed.                Video-Visit Details    Video Start Time: 8:17 AM    Type of service:  Video Visit    Video End Time:8:42 AM    Originating Location (pt. Location): Home    Distant Location (provider location):  [facility]     Platform used for Video Visit: [email or web address]  Denise.

## 2022-08-24 ENCOUNTER — LAB (OUTPATIENT)
Dept: LAB | Facility: CLINIC | Age: 57
End: 2022-08-24
Payer: COMMERCIAL

## 2022-08-24 ENCOUNTER — TELEPHONE (OUTPATIENT)
Dept: FAMILY MEDICINE | Facility: CLINIC | Age: 57
End: 2022-08-24

## 2022-08-24 DIAGNOSIS — Z13.220 SCREENING CHOLESTEROL LEVEL: Primary | ICD-10-CM

## 2022-08-24 DIAGNOSIS — R73.03 PRE-DIABETES: ICD-10-CM

## 2022-08-24 DIAGNOSIS — Z13.220 SCREENING CHOLESTEROL LEVEL: ICD-10-CM

## 2022-08-24 DIAGNOSIS — Z13.1 SCREENING FOR DIABETES MELLITUS: ICD-10-CM

## 2022-08-24 LAB
ALBUMIN SERPL-MCNC: 4.3 G/DL (ref 3.4–5)
ALP SERPL-CCNC: 128 U/L (ref 40–150)
ALT SERPL W P-5'-P-CCNC: 27 U/L (ref 0–50)
ANION GAP SERPL CALCULATED.3IONS-SCNC: 9 MMOL/L (ref 3–14)
AST SERPL W P-5'-P-CCNC: 17 U/L (ref 0–45)
BILIRUB SERPL-MCNC: 0.9 MG/DL (ref 0.2–1.3)
BUN SERPL-MCNC: 14 MG/DL (ref 7–30)
CALCIUM SERPL-MCNC: 9 MG/DL (ref 8.5–10.1)
CHLORIDE BLD-SCNC: 103 MMOL/L (ref 94–109)
CHOLEST SERPL-MCNC: 185 MG/DL
CO2 SERPL-SCNC: 25 MMOL/L (ref 20–32)
CREAT SERPL-MCNC: 0.65 MG/DL (ref 0.52–1.04)
FASTING STATUS PATIENT QL REPORTED: YES
FASTING STATUS PATIENT QL REPORTED: YES
GFR SERPL CREATININE-BSD FRML MDRD: >90 ML/MIN/1.73M2
GLUCOSE BLD-MCNC: 115 MG/DL (ref 70–99)
GLUCOSE BLD-MCNC: 115 MG/DL (ref 70–99)
HBA1C MFR BLD: 6.7 % (ref 0–5.6)
HDLC SERPL-MCNC: 74 MG/DL
HOLD SPECIMEN: NORMAL
LDLC SERPL CALC-MCNC: 99 MG/DL
NONHDLC SERPL-MCNC: 111 MG/DL
POTASSIUM BLD-SCNC: 3.7 MMOL/L (ref 3.4–5.3)
PROT SERPL-MCNC: 7.3 G/DL (ref 6.8–8.8)
SODIUM SERPL-SCNC: 137 MMOL/L (ref 133–144)
TRIGL SERPL-MCNC: 61 MG/DL

## 2022-08-24 PROCEDURE — 80053 COMPREHEN METABOLIC PANEL: CPT

## 2022-08-24 PROCEDURE — 80061 LIPID PANEL: CPT

## 2022-08-24 PROCEDURE — 83036 HEMOGLOBIN GLYCOSYLATED A1C: CPT

## 2022-08-24 PROCEDURE — 36415 COLL VENOUS BLD VENIPUNCTURE: CPT

## 2022-08-24 NOTE — TELEPHONE ENCOUNTER
Patient asking if lipid test could be added to her labs that were drawn yesterday.]  Please advise  Thank you

## 2022-09-01 ENCOUNTER — TELEPHONE (OUTPATIENT)
Dept: FAMILY MEDICINE | Facility: CLINIC | Age: 57
End: 2022-09-01

## 2022-09-01 DIAGNOSIS — E11.9 TYPE 2 DIABETES MELLITUS WITHOUT COMPLICATION, WITHOUT LONG-TERM CURRENT USE OF INSULIN (H): Primary | ICD-10-CM

## 2022-09-01 RX ORDER — METFORMIN HCL 500 MG
500 TABLET, EXTENDED RELEASE 24 HR ORAL
Qty: 120 TABLET | Refills: 2 | Status: SHIPPED | OUTPATIENT
Start: 2022-09-01 | End: 2022-11-22

## 2022-09-01 NOTE — TELEPHONE ENCOUNTER
"Pt notified via phone of provider message below as written. Pt indicates understanding of issues and agrees with the plan.    RN unable to sign pending metformin order due to no associated dx.   Please review Problem List and update if prediabetes dx should be changed to a diabetes dx.    Pt has the following additional questions:    1. Pt agrees to start Metformin, but inquires if it is possible that she may be able to reduce her dose in the future or control her BG with diet and exercise alone?    2. Pt inquires if she now has diabetes, can she ever get back into the \"prediabetes\" range again?    IRMA Andre, RN      "

## 2022-09-01 NOTE — TELEPHONE ENCOUNTER
Pt notified via phone of provider message below as written. Pt indicates understanding of issues and agrees with the plan. Information has also been sent to pt via iSTAR message to review again at a later time as needed.    NITISH AndreN, RN

## 2022-09-01 NOTE — TELEPHONE ENCOUNTER
Incoming call from pt. Pt states she has reviewed the lab result note below and would like to start metformin.    Pt inquires if Dr. Mike will prescribe metformin as recommended, or does pt need to submit an Evisit, or a Video Visit to discuss?    Dr. Mike is currently out of the office and I am reviewing her results.   Your kidney function and liver testing are normal.  Your long term blood sugar testing is in the diabetes range now.  Careful attention to dietary management is recommended.  Use of metformin can be considered for the control of blood sugars based on these results.  I see you are moving according to Dr. Alonzo's notes.  You can follow up with her to begin treatment before your move or establish care in [place] within 3 months and have follow up labs done there.  Please call or MyChart message me if you have any questions.        Christianne Clark MD   Written by Christianne Clark MD on 8/24/2022  4:06 PM CDT  Seen by patient Sudhakar Zarate on 8/30/2022  9:19 PM    IRMA Andre, RN

## 2022-09-01 NOTE — TELEPHONE ENCOUNTER
1. May be able to reduce dose in future although this is standard dose for most diabetics.     2. Can absolutely lower glucose back to pre-diabetic range with diet/exercise/meds.

## 2022-09-01 NOTE — TELEPHONE ENCOUNTER
Ok to start metformin.   Start with one tablet daily with meals.   Increase dose by one tablet each week to goal dose of 1,000 mg (two tabs) twice daily.   If GI upset or AE occur, to go back to previous dose that didn't cause issues and titrate up more slowly (increase by one pill every 2-3 weeks)  She should try to set up with provider in [place] in next 1-3 months.   Rx pended as no pharmacy was picked.   Ok to for RN to send to her preferred pharmacy.

## 2022-10-18 ENCOUNTER — MYC MEDICAL ADVICE (OUTPATIENT)
Dept: FAMILY MEDICINE | Facility: CLINIC | Age: 57
End: 2022-10-18

## 2022-10-18 DIAGNOSIS — E11.9 TYPE 2 DIABETES MELLITUS WITHOUT COMPLICATION, WITHOUT LONG-TERM CURRENT USE OF INSULIN (H): Primary | ICD-10-CM

## 2022-11-15 ASSESSMENT — ENCOUNTER SYMPTOMS
FEVER: 0
JOINT SWELLING: 0
EYE PAIN: 0
HEADACHES: 0
ABDOMINAL PAIN: 0
BREAST MASS: 0
COUGH: 0
SORE THROAT: 0
WEAKNESS: 0
HEARTBURN: 0
SHORTNESS OF BREATH: 0
DIZZINESS: 0
NERVOUS/ANXIOUS: 0
ARTHRALGIAS: 0
CHILLS: 0
FREQUENCY: 0
PARESTHESIAS: 0
HEMATOCHEZIA: 0
DYSURIA: 0
MYALGIAS: 0
HEMATURIA: 0
DIARRHEA: 0
CONSTIPATION: 0
NAUSEA: 0
PALPITATIONS: 0

## 2022-11-20 ENCOUNTER — HEALTH MAINTENANCE LETTER (OUTPATIENT)
Age: 57
End: 2022-11-20

## 2022-11-22 ENCOUNTER — OFFICE VISIT (OUTPATIENT)
Dept: FAMILY MEDICINE | Facility: CLINIC | Age: 57
End: 2022-11-22
Payer: COMMERCIAL

## 2022-11-22 VITALS
RESPIRATION RATE: 12 BRPM | TEMPERATURE: 96.5 F | BODY MASS INDEX: 23.7 KG/M2 | DIASTOLIC BLOOD PRESSURE: 78 MMHG | OXYGEN SATURATION: 100 % | HEIGHT: 64 IN | SYSTOLIC BLOOD PRESSURE: 136 MMHG | HEART RATE: 56 BPM | WEIGHT: 138.8 LBS

## 2022-11-22 DIAGNOSIS — M25.532 LEFT WRIST PAIN: ICD-10-CM

## 2022-11-22 DIAGNOSIS — M85.852 OSTEOPENIA OF BOTH HIPS: ICD-10-CM

## 2022-11-22 DIAGNOSIS — M85.851 OSTEOPENIA OF BOTH HIPS: ICD-10-CM

## 2022-11-22 DIAGNOSIS — Z00.00 ROUTINE GENERAL MEDICAL EXAMINATION AT A HEALTH CARE FACILITY: Primary | ICD-10-CM

## 2022-11-22 DIAGNOSIS — E11.9 TYPE 2 DIABETES MELLITUS WITHOUT COMPLICATION, WITHOUT LONG-TERM CURRENT USE OF INSULIN (H): ICD-10-CM

## 2022-11-22 LAB
CREAT UR-MCNC: 52.1 MG/DL
HBA1C MFR BLD: 6.4 % (ref 0–5.6)
MICROALBUMIN UR-MCNC: <12 MG/L
MICROALBUMIN/CREAT UR: NORMAL MG/G{CREAT}

## 2022-11-22 PROCEDURE — 82043 UR ALBUMIN QUANTITATIVE: CPT | Performed by: NURSE PRACTITIONER

## 2022-11-22 PROCEDURE — 99396 PREV VISIT EST AGE 40-64: CPT | Performed by: NURSE PRACTITIONER

## 2022-11-22 PROCEDURE — 83036 HEMOGLOBIN GLYCOSYLATED A1C: CPT | Performed by: NURSE PRACTITIONER

## 2022-11-22 PROCEDURE — 99214 OFFICE O/P EST MOD 30 MIN: CPT | Mod: 25 | Performed by: NURSE PRACTITIONER

## 2022-11-22 PROCEDURE — 36415 COLL VENOUS BLD VENIPUNCTURE: CPT | Performed by: NURSE PRACTITIONER

## 2022-11-22 PROCEDURE — 80048 BASIC METABOLIC PNL TOTAL CA: CPT | Performed by: NURSE PRACTITIONER

## 2022-11-22 ASSESSMENT — ENCOUNTER SYMPTOMS
FREQUENCY: 0
HEADACHES: 0
FEVER: 0
SORE THROAT: 0
DIARRHEA: 0
CHILLS: 0
NAUSEA: 0
PARESTHESIAS: 0
PALPITATIONS: 0
DIZZINESS: 0
BREAST MASS: 0
DYSURIA: 0
NERVOUS/ANXIOUS: 0
MYALGIAS: 0
HEMATURIA: 0
HEMATOCHEZIA: 0
EYE PAIN: 0
WEAKNESS: 0
CONSTIPATION: 0
ABDOMINAL PAIN: 0
JOINT SWELLING: 0
ARTHRALGIAS: 0
HEARTBURN: 0
COUGH: 0
SHORTNESS OF BREATH: 0

## 2022-11-22 NOTE — PROGRESS NOTES
SUBJECTIVE:   CC: Sudhakar is an 57 year old who presents for preventive health visit.       Pt was seen 3 months ago for annual. Her a1c  Was 6.7 and she was recommended metformin. Pt wanted to try lifestyle for 90 days before recheck and did not start metformin. Pt has made significant but sustainable changes to include increasing exercise, a moderate reduction in carbs, and not eating after 6pm. States if her A1c is >7 she would like to begin medication.    Pt has moved to [place] but visits [place] regularly to see family. Has not yet established care with primary in [place].     Pt additionally concerned for bilat thumb nodules on the distal joint, and pain to L wrist. These are mildly painful at times, worse with typing. Pt has tried rest, ergonomic computer equipment (works in IT), and a brace. Has not taken medications for this in the past.       Patient has been advised of split billing requirements and indicates understanding: Yes  Healthy Habits:     Getting at least 3 servings of Calcium per day:  Yes    Bi-annual eye exam:  Yes    Dental care twice a year:  Yes    Sleep apnea or symptoms of sleep apnea:  None    Diet:  Regular (no restrictions)    Frequency of exercise:  6-7 days/week    Duration of exercise:  45-60 minutes    Taking medications regularly:  Yes    Medication side effects:  None    PHQ-2 Total Score: 0    Additional concerns today:  No      BP Readings from Last 2 Encounters:   11/22/22 136/78   12/30/20 116/82     Hemoglobin A1C (%)   Date Value   11/22/2022 6.4 (H)   08/24/2022 6.7 (H)   12/30/2020 6.3 (H)   01/07/2020 6.3 (H)     LDL Cholesterol Calculated (mg/dL)   Date Value   08/24/2022 99   12/30/2020 106 (H)   09/30/2019 90         Today's PHQ-2 Score:   PHQ-2 ( 1999 Pfizer) 11/15/2022   Q1: Little interest or pleasure in doing things 0   Q2: Feeling down, depressed or hopeless 0   PHQ-2 Score 0   PHQ-2 Total Score (12-17 Years)- Positive if 3 or more points; Administer PHQ-A if positive -   Q1:  Little interest or pleasure in doing things Not at all   Q2: Feeling down, depressed or hopeless Not at all   PHQ-2 Score 0           Social History     Tobacco Use     Smoking status: Never     Smokeless tobacco: Never   Substance Use Topics     Alcohol use: Not Currently     Comment: rare         Alcohol Use 11/15/2022   Prescreen: >3 drinks/day or >7 drinks/week? No   Prescreen: >3 drinks/day or >7 drinks/week? -       Reviewed orders with patient.  Reviewed health maintenance and updated orders accordingly - Yes      Breast Cancer Screening:    Breast CA Risk Assessment (FHS-7) 11/15/2022   Do you have a family history of breast, colon, or ovarian cancer? No / Unknown           Pertinent mammograms are reviewed under the imaging tab.    History of abnormal Pap smear:   PAP / HPV Latest Ref Rng & Units 9/30/2019 12/2/2016 9/19/2013   PAP (Historical) - NIL ASC-US(A) NIL   HPV16 NEG:Negative Negative Negative -   HPV18 NEG:Negative Negative Negative -   HRHPV NEG:Negative Negative Negative -     Reviewed and updated as needed this visit by clinical staff   Tobacco  Allergies  Meds              Reviewed and updated as needed this visit by Provider                     Review of Systems   Constitutional: Negative for chills and fever.   HENT: Negative for congestion, ear pain, hearing loss and sore throat.    Eyes: Negative for pain and visual disturbance.   Respiratory: Negative for cough and shortness of breath.    Cardiovascular: Negative for chest pain, palpitations and peripheral edema.   Gastrointestinal: Negative for abdominal pain, constipation, diarrhea, heartburn, hematochezia and nausea.   Breasts:  Negative for tenderness, breast mass and discharge.   Genitourinary: Negative for dysuria, frequency, genital sores, hematuria, pelvic pain, urgency, vaginal bleeding and vaginal discharge.   Musculoskeletal: Negative for arthralgias, joint swelling and myalgias.   Skin: Negative for rash.   Neurological:  "Negative for dizziness, weakness, headaches and paresthesias.   Psychiatric/Behavioral: Negative for mood changes. The patient is not nervous/anxious.           OBJECTIVE:   /78   Pulse 56   Temp (!) 96.5  F (35.8  C) (Tympanic)   Resp 12   Ht 1.626 m (5' 4\")   Wt 63 kg (138 lb 12.8 oz)   LMP 04/10/2015 (Approximate)   SpO2 100%   BMI 23.82 kg/m       GENERAL: healthy, alert and no distress  EYES:  Grossly normal, no conjunctivitis  RESP: lungs clear to auscultation - no rales, rhonchi or wheezes  CV: regular rate and rhythm, normal S1 S2, no S3 or S4, no murmur, click or rub, no peripheral edema  ABDOMEN: non-tender to palpation  MSK- full ROM, hands non tender to palpation, - Tinel,-phalen    Diagnostic Test Results:  Labs reviewed in Epic    ASSESSMENT/PLAN:   Sudhakar was seen today for physical.    Diagnoses and all orders for this visit:    Routine general medical examination at a health care facility.  -establish care in [place]     Type 2 diabetes mellitus without complication, without long-term current use of insulin (H)  Lab Results   Component Value Date    A1C 6.4 11/22/2022    A1C 6.7 08/24/2022    A1C 6.3 12/30/2020    A1C 6.3 01/07/2020    A1C 6.1 09/30/2019    A1C 6.3 10/22/2018    A1C 6.1 07/27/2017   Stable, well controlled.  Continue with dietary and lifestyle modifications to support blood glucose levels.    -     Hemoglobin A1c; Future  -     Basic metabolic panel  (Ca, Cl, CO2, Creat, Gluc, K, Na, BUN); Future  -     Albumin Random Urine Quantitative with Creat Ratio; Future       Left Wrist Pain  - May begin tylenol or motrin for pain if desired  - If acutely concerned, would consider hand PT or ortho referral       Osteopenia of both hips  Last bone scan completed in December 2020, which showed osteopenia.    -     DEXA HIP/PELVIS/SPINE - Future; Future        COUNSELING:  Reviewed preventive health counseling, as reflected in patient instructions  Special attention given to:        " Regular exercise       Healthy diet/nutrition       Vision screening      She reports that she has never smoked. She has never used smokeless tobacco.      Fam Felix RN, FNP Student      I was present with the student who participated in the service and in the documentation of the note, which I have reviewed and verified. The history, reviews of systems, objective data, and assessment/plan were completed by myself.      ISMAEL Villareal [facility]

## 2022-11-22 NOTE — RESULT ENCOUNTER NOTE
Dear Sudhakar,     -A1C (test of diabetes control the last 2-3 months) is at your goal. Please continue with your current plan and plan Diabetes follow-up in 6 months.        Please send a [employer] message or call [phone]  if you have any questions.      Pascale Giles, ISMAEL, CNP  [facility]    If you have further questions about the interpretation of your labs, [email or web address] is a good website to check out for further information.

## 2022-11-23 LAB
ANION GAP SERPL CALCULATED.3IONS-SCNC: 16 MMOL/L (ref 7–15)
BUN SERPL-MCNC: 14.6 MG/DL (ref 6–20)
CALCIUM SERPL-MCNC: 9.7 MG/DL (ref 8.6–10)
CHLORIDE SERPL-SCNC: 102 MMOL/L (ref 98–107)
CREAT SERPL-MCNC: 0.63 MG/DL (ref 0.51–0.95)
DEPRECATED HCO3 PLAS-SCNC: 23 MMOL/L (ref 22–29)
GFR SERPL CREATININE-BSD FRML MDRD: >90 ML/MIN/1.73M2
GLUCOSE SERPL-MCNC: 105 MG/DL (ref 70–99)
POTASSIUM SERPL-SCNC: 4.3 MMOL/L (ref 3.4–5.3)
SODIUM SERPL-SCNC: 141 MMOL/L (ref 136–145)

## 2022-11-23 NOTE — RESULT ENCOUNTER NOTE
Kate De La Fuente,     -Kidney function (GFR) is normal.  -Sodium is normal.  -Potassium is normal.  -Calcium is normal.  -Glucose is slightly elevated due to your diabetes.  -Microalbumin (urine protein) test is normal.  ADVISE: rechecking this annually.      Please send a [employer] message or call [phone]  if you have any questions.      Pascale Giles, APRN, CNP  [facility]    If you have further questions about the interpretation of your labs, [email or web address] is a good website to check out for further information.

## 2022-11-25 ENCOUNTER — HOSPITAL ENCOUNTER (OUTPATIENT)
Dept: MAMMOGRAPHY | Facility: CLINIC | Age: 57
Discharge: HOME | End: 2022-11-25
Attending: FAMILY MEDICINE | Admitting: FAMILY MEDICINE
Payer: COMMERCIAL

## 2022-11-25 DIAGNOSIS — Z12.31 VISIT FOR SCREENING MAMMOGRAM: ICD-10-CM

## 2022-11-25 PROCEDURE — 77067 SCR MAMMO BI INCL CAD: CPT

## 2023-04-15 ENCOUNTER — HEALTH MAINTENANCE LETTER (OUTPATIENT)
Age: 58
End: 2023-04-15

## 2023-04-22 NOTE — PROGRESS NOTES
Patient was only seen for 1 visit, so current assessment and status is unavailable.  Please see initial evaluation for most recent status.  Patient is discharged from PT at this time due to no further follow-up.

## 2023-05-17 ENCOUNTER — MYC MEDICAL ADVICE (OUTPATIENT)
Dept: FAMILY MEDICINE | Facility: CLINIC | Age: 58
End: 2023-05-17
Payer: COMMERCIAL

## 2023-05-17 DIAGNOSIS — R05.9 COUGH, UNSPECIFIED TYPE: Primary | ICD-10-CM

## 2023-05-18 NOTE — TELEPHONE ENCOUNTER
Please make sure we have a radiology tech scheduled for this today.  If not, I would prefer her to get chest x-ray completed prior to the visit and I will place order just in case

## 2023-05-25 NOTE — TELEPHONE ENCOUNTER
We have imaging coverage at [facility] on 6/8. TCs have access to imaging schedules on sharepoint site. Please assist with scheduling.

## 2023-05-30 NOTE — TELEPHONE ENCOUNTER
Pt scheduled for physical and we do have x-ray sched for this day as of today 5/30. FYI to provider  Char Bedoya CMA

## 2023-06-03 ASSESSMENT — ENCOUNTER SYMPTOMS
WEAKNESS: 0
COUGH: 0
DIARRHEA: 0
HEARTBURN: 0
SORE THROAT: 0
JOINT SWELLING: 0
PARESTHESIAS: 0
CHILLS: 0
DYSURIA: 0
HEADACHES: 0
HEMATOCHEZIA: 0
DIZZINESS: 0
CONSTIPATION: 0
FREQUENCY: 0
PALPITATIONS: 0
NERVOUS/ANXIOUS: 0
FEVER: 0
BREAST MASS: 0
NAUSEA: 0
ABDOMINAL PAIN: 0
ARTHRALGIAS: 0
HEMATURIA: 0
EYE PAIN: 0
SHORTNESS OF BREATH: 0
MYALGIAS: 0

## 2023-06-08 ENCOUNTER — OFFICE VISIT (OUTPATIENT)
Dept: FAMILY MEDICINE | Facility: CLINIC | Age: 58
End: 2023-06-08
Payer: COMMERCIAL

## 2023-06-08 ENCOUNTER — HOSPITAL ENCOUNTER (OUTPATIENT)
Dept: BONE DENSITY | Facility: CLINIC | Age: 58
Discharge: HOME | End: 2023-06-08
Attending: NURSE PRACTITIONER | Admitting: NURSE PRACTITIONER
Payer: COMMERCIAL

## 2023-06-08 ENCOUNTER — ANCILLARY PROCEDURE (OUTPATIENT)
Dept: GENERAL RADIOLOGY | Facility: CLINIC | Age: 58
End: 2023-06-08
Attending: FAMILY MEDICINE
Payer: COMMERCIAL

## 2023-06-08 VITALS
SYSTOLIC BLOOD PRESSURE: 119 MMHG | TEMPERATURE: 97.9 F | DIASTOLIC BLOOD PRESSURE: 76 MMHG | BODY MASS INDEX: 24.31 KG/M2 | HEART RATE: 58 BPM | OXYGEN SATURATION: 99 % | WEIGHT: 142.4 LBS | HEIGHT: 64 IN | RESPIRATION RATE: 10 BRPM

## 2023-06-08 DIAGNOSIS — R05.3 CHRONIC COUGH: ICD-10-CM

## 2023-06-08 DIAGNOSIS — E11.9 TYPE 2 DIABETES MELLITUS WITHOUT COMPLICATION, WITHOUT LONG-TERM CURRENT USE OF INSULIN (H): ICD-10-CM

## 2023-06-08 DIAGNOSIS — Z00.00 ROUTINE GENERAL MEDICAL EXAMINATION AT A HEALTH CARE FACILITY: Primary | ICD-10-CM

## 2023-06-08 DIAGNOSIS — Z13.220 SCREENING CHOLESTEROL LEVEL: ICD-10-CM

## 2023-06-08 DIAGNOSIS — M85.851 OSTEOPENIA OF BOTH HIPS: ICD-10-CM

## 2023-06-08 DIAGNOSIS — T75.3XXS: ICD-10-CM

## 2023-06-08 DIAGNOSIS — M85.852 OSTEOPENIA OF BOTH HIPS: ICD-10-CM

## 2023-06-08 DIAGNOSIS — R74.8 ELEVATED ALKALINE PHOSPHATASE LEVEL: ICD-10-CM

## 2023-06-08 LAB
ALBUMIN SERPL BCG-MCNC: 4.8 G/DL (ref 3.5–5.2)
ALP SERPL-CCNC: 138 U/L (ref 35–104)
ALT SERPL W P-5'-P-CCNC: 19 U/L (ref 10–35)
ANION GAP SERPL CALCULATED.3IONS-SCNC: 12 MMOL/L (ref 7–15)
AST SERPL W P-5'-P-CCNC: 22 U/L (ref 10–35)
BILIRUB SERPL-MCNC: 0.9 MG/DL
BUN SERPL-MCNC: 13.5 MG/DL (ref 6–20)
CALCIUM SERPL-MCNC: 9.4 MG/DL (ref 8.6–10)
CHLORIDE SERPL-SCNC: 104 MMOL/L (ref 98–107)
CHOLEST SERPL-MCNC: 179 MG/DL
CREAT SERPL-MCNC: 0.65 MG/DL (ref 0.51–0.95)
DEPRECATED HCO3 PLAS-SCNC: 26 MMOL/L (ref 22–29)
GFR SERPL CREATININE-BSD FRML MDRD: >90 ML/MIN/1.73M2
GLUCOSE SERPL-MCNC: 114 MG/DL (ref 70–99)
HBA1C MFR BLD: 6.6 % (ref 0–5.6)
HDLC SERPL-MCNC: 76 MG/DL
LDLC SERPL CALC-MCNC: 94 MG/DL
NONHDLC SERPL-MCNC: 103 MG/DL
POTASSIUM SERPL-SCNC: 4.2 MMOL/L (ref 3.4–5.3)
PROT SERPL-MCNC: 7.2 G/DL (ref 6.4–8.3)
SODIUM SERPL-SCNC: 142 MMOL/L (ref 136–145)
TRIGL SERPL-MCNC: 47 MG/DL
TSH SERPL DL<=0.005 MIU/L-ACNC: 0.66 UIU/ML (ref 0.3–4.2)

## 2023-06-08 PROCEDURE — 71046 X-RAY EXAM CHEST 2 VIEWS: CPT | Mod: TC | Performed by: RADIOLOGY

## 2023-06-08 PROCEDURE — 99396 PREV VISIT EST AGE 40-64: CPT | Mod: 25 | Performed by: FAMILY MEDICINE

## 2023-06-08 PROCEDURE — 83036 HEMOGLOBIN GLYCOSYLATED A1C: CPT | Performed by: FAMILY MEDICINE

## 2023-06-08 PROCEDURE — 36415 COLL VENOUS BLD VENIPUNCTURE: CPT | Performed by: FAMILY MEDICINE

## 2023-06-08 PROCEDURE — 90677 PCV20 VACCINE IM: CPT | Performed by: FAMILY MEDICINE

## 2023-06-08 PROCEDURE — 90471 IMMUNIZATION ADMIN: CPT | Performed by: FAMILY MEDICINE

## 2023-06-08 PROCEDURE — 99207 PR FOOT EXAM NO CHARGE: CPT | Mod: 25 | Performed by: FAMILY MEDICINE

## 2023-06-08 PROCEDURE — 84443 ASSAY THYROID STIM HORMONE: CPT | Performed by: FAMILY MEDICINE

## 2023-06-08 PROCEDURE — 80061 LIPID PANEL: CPT | Performed by: FAMILY MEDICINE

## 2023-06-08 PROCEDURE — 77080 DXA BONE DENSITY AXIAL: CPT

## 2023-06-08 PROCEDURE — 82977 ASSAY OF GGT: CPT | Performed by: FAMILY MEDICINE

## 2023-06-08 PROCEDURE — 99214 OFFICE O/P EST MOD 30 MIN: CPT | Mod: 25 | Performed by: FAMILY MEDICINE

## 2023-06-08 PROCEDURE — 80053 COMPREHEN METABOLIC PANEL: CPT | Performed by: FAMILY MEDICINE

## 2023-06-08 RX ORDER — CIPROFLOXACIN 500 MG/1
500 TABLET, FILM COATED ORAL 2 TIMES DAILY
Qty: 6 TABLET | Refills: 0 | Status: SHIPPED | OUTPATIENT
Start: 2023-06-08 | End: 2024-05-29

## 2023-06-08 RX ORDER — FLUTICASONE PROPIONATE 50 MCG
1 SPRAY, SUSPENSION (ML) NASAL DAILY
Qty: 16 G | Refills: 11 | Status: SHIPPED | OUTPATIENT
Start: 2023-06-08 | End: 2024-05-29

## 2023-06-08 ASSESSMENT — ENCOUNTER SYMPTOMS
FREQUENCY: 0
ARTHRALGIAS: 0
NAUSEA: 0
HEADACHES: 0
NERVOUS/ANXIOUS: 0
CONSTIPATION: 0
HEMATOCHEZIA: 0
FEVER: 0
SHORTNESS OF BREATH: 0
HEARTBURN: 0
SORE THROAT: 0
PALPITATIONS: 0
JOINT SWELLING: 0
DIZZINESS: 0
DIARRHEA: 0
COUGH: 0
DYSURIA: 0
ABDOMINAL PAIN: 0
PARESTHESIAS: 0
MYALGIAS: 0
EYE PAIN: 0
CHILLS: 0
HEMATURIA: 0
WEAKNESS: 0
BREAST MASS: 0

## 2023-06-08 ASSESSMENT — PAIN SCALES - GENERAL: PAINLEVEL: NO PAIN (0)

## 2023-06-08 NOTE — PROGRESS NOTES
SUBJECTIVE:   CC: Sudhakar is an 58 year old who presents for preventive health visit.        View : No data to display.              HPI    Living in [place], still works for [employer]  Daughter just graduated from residency and back in town.     Lab Results   Component Value Date    A1C 6.4 11/22/2022    A1C 6.7 08/24/2022    A1C 6.3 12/30/2020    A1C 6.3 01/07/2020    A1C 6.1 09/30/2019    A1C 6.3 10/22/2018    A1C 6.1 07/27/2017      never started metformin. Doesn't want to stary meds if possible  Exercise daily in AM (tennis and walking), 15 min strength training per day  Trying to keep sugar intake down.     Got covid bivalent vaccine in jan.     Just completed eye exam yesterday. [facility] in [place]  Epiretinal membrane found.     Scheduled for bone density    Noted some cough intermittently, seems to be coming from throat. Dry cough. Not daily. Present 1 + years. Feeling of mucous in throat. Feel like need to clear throat.   Breathing has been good.   Would like cxr  + gerd occ. Will use tums infrequently.   + dry eyes. No nasal congestion.     Going back to [place] in Aug.   Requesting abx to have on hand for chest infection (see past visits) and uti. Doesn't trust their medication    No kindey stone sx's.     Today's PHQ-2 Score:       6/8/2023     7:55 AM   PHQ-2 ( 1999 Pfizer)   Q1: Little interest or pleasure in doing things 0   Q2: Feeling down, depressed or hopeless 0   PHQ-2 Score 0   Q1: Little interest or pleasure in doing things Not at all   Q2: Feeling down, depressed or hopeless Not at all   PHQ-2 Score 0           Social History     Tobacco Use     Smoking status: Never     Smokeless tobacco: Never   Vaping Use     Vaping status: Never Used   Substance Use Topics     Alcohol use: Not Currently     Comment: rare             6/3/2023     3:45 PM   Alcohol Use   Prescreen: >3 drinks/day or >7 drinks/week? No     Reviewed orders with patient.  Reviewed health maintenance and updated  orders accordingly - Yes      Breast Cancer Screenin/15/2022     1:28 PM 2022     8:59 AM   Breast CA Risk Assessment (FHS-7)   Do you have a family history of breast, colon, or ovarian cancer? No / Unknown No / Unknown         Mammogram Screening - Offered annual screening and updated Health Maintenance for mutual plan based on risk factor consideration    Pertinent mammograms are reviewed under the imaging tab.    History of abnormal Pap smear: NO - age 30-65 PAP every 5 years with negative HPV co-testing recommended  YES - updated in Problem List and Health Maintenance accordingly      Latest Ref Rng & Units 2019     7:39 AM 2019     7:33 AM 2016     8:24 AM   PAP / HPV   PAP (Historical)  NIL       HPV 16 DNA NEG^Negative  Negative   Negative     HPV 18 DNA NEG^Negative  Negative   Negative     Other HR HPV NEG^Negative  Negative   Negative       Reviewed and updated as needed this visit by clinical staff                  Reviewed and updated as needed this visit by Provider                     Review of Systems   Constitutional: Negative for chills and fever.   HENT: Negative for congestion, ear pain, hearing loss and sore throat.    Eyes: Negative for pain and visual disturbance.   Respiratory: Negative for cough and shortness of breath.    Cardiovascular: Negative for chest pain, palpitations and peripheral edema.   Gastrointestinal: Negative for abdominal pain, constipation, diarrhea, heartburn, hematochezia and nausea.   Breasts:  Negative for tenderness, breast mass and discharge.   Genitourinary: Negative for dysuria, frequency, genital sores, hematuria, pelvic pain, urgency, vaginal bleeding and vaginal discharge.   Musculoskeletal: Negative for arthralgias, joint swelling and myalgias.   Skin: Negative for rash.   Neurological: Negative for dizziness, weakness, headaches and paresthesias.   Psychiatric/Behavioral: Negative for mood changes. The patient is not  "nervous/anxious.         OBJECTIVE:   /76 (BP Location: Right arm, Patient Position: Sitting, Cuff Size: Adult Regular)   Pulse 58   Temp 97.9  F (36.6  C) (Oral)   Resp 10   Ht 1.626 m (5' 4\")   Wt 64.6 kg (142 lb 6.4 oz)   LMP 04/10/2015 (Approximate)   SpO2 99%   BMI 24.44 kg/m    Physical Exam  GENERAL: healthy, alert and no distress  EYES: Eyes grossly normal to inspection, PERRL and conjunctivae and sclerae normal  HENT: ear canals and TM's normal, nose and mouth without ulcers or lesions  NECK: no adenopathy, no asymmetry, masses, or scars and thyroid normal to palpation  RESP: lungs clear to auscultation - no rales, rhonchi or wheezes  BREAST: normal without masses, tenderness or nipple discharge and no palpable axillary masses or adenopathy  CV: regular rate and rhythm, normal S1 S2, no S3 or S4, no murmur, click or rub, no peripheral edema and peripheral pulses strong  ABDOMEN: soft, nontender, no hepatosplenomegaly, no masses and bowel sounds normal  MS: no gross musculoskeletal defects noted, no edema  SKIN: no suspicious lesions or rashes  NEURO: Normal strength and tone, mentation intact and speech normal  PSYCH: mentation appears normal, affect normal/bright  Foot: Diabetic foot exam shows normal response to monofilament testing.  No trophic changes.  Pulses are intact    Diagnostic Test Results:  Labs reviewed in [facility]    The 10-year ASCVD risk score (Gloria DK, et al., 2019) is: 2.8%    Values used to calculate the score:      Age: 58 years      Sex: Female      Is Non- : No      Diabetic: Yes      Tobacco smoker: No      Systolic Blood Pressure: 119 mmHg      Is BP treated: No      HDL Cholesterol: 76 mg/dL      Total Cholesterol: 179 mg/dL      ASSESSMENT/PLAN:   (Z00.00) Routine general medical examination at a health care facility  (primary encounter diagnosis)      (E11.9) Type 2 diabetes mellitus without complication, without long-term current use of " insulin (H)  Comment: Very borderline type 2 diabetes that has been well controlled with diet and exercise.  Given her low ASCVD risk and lack of additional risk factors outside of age, I have not yet started aspirin or a statin but these could be considered.  Plan: HEMOGLOBIN A1C, Lipid panel reflex to direct         LDL Fasting, Comprehensive metabolic panel (BMP        + Alb, Alk Phos, ALT, AST, Total. Bili, TP),         TSH with free T4 reflex, FOOT EXAM       (R05.3) Chronic cough  Comment: I suspect she has postnasal drainage given the significant nasal congestion I see on today's exam.  Plan: XR Chest 2 Views, fluticasone (FLONASE) 50         MCG/ACT nasal spray        We will trial Flonase.  Certainly if this is not helpful could also trial Prilosec as an alternative.  See instructions.  Chest x-ray is reassuring.    (T75.3XXS) Travel sickness, sequela  Comment: Given as needed ciprofloxacin to use for traveler's diarrhea, UTI or respiratory illness as needed during travel to [place]  Plan: ciprofloxacin (CIPRO) 500 MG tablet            (Z13.220) Screening cholesterol level  Comment:   Plan: TSH with free T4 reflex                  COUNSELING:  Reviewed preventive health counseling, as reflected in patient instructions       Regular exercise       Healthy diet/nutrition       Vision screening       Osteoporosis prevention/bone health       Colorectal Cancer Screening        She reports that she has never smoked. She has never used smokeless tobacco.    Patient Instructions   For cough:  - trial of flonase nasal spray for one month. If helpful, can continue long term.   - if flonase not helpful, start Prilosec 20 mg once daily (can message me for Rx) for one month trial.       Cipro as needed for travel.   Don't take calcium near/with the medication.         Preventive Health Recommendations  Female Ages 50 - 64    Yearly exam: See your health care provider every year in order to  o Review health changes.    o Discuss preventive care.    o Review your medicines if your doctor has prescribed any.      Get a Pap test every three years (unless you have an abnormal result and your provider advises testing more often).    If you get Pap tests with HPV test, you only need to test every 5 years, unless you have an abnormal result.     You do not need a Pap test if your uterus was removed (hysterectomy) and you have not had cancer.    You should be tested each year for STDs (sexually transmitted diseases) if you're at risk.     Have a mammogram every 1 to 2 years.    Have a colonoscopy at age 50, or have a yearly FIT test (stool test). These exams screen for colon cancer.      Have a cholesterol test every 5 years, or more often if advised.    Have a diabetes test (fasting glucose) every three years. If you are at risk for diabetes, you should have this test more often.     If you are at risk for osteoporosis (brittle bone disease), think about having a bone density scan (DEXA).    Shots: Get a flu shot each year. Get a tetanus shot every 10 years.    Nutrition:     Eat at least 5 servings of fruits and vegetables each day.    Eat whole-grain bread, whole-wheat pasta and brown rice instead of white grains and rice.    Get adequate Calcium and Vitamin D.     Lifestyle    Exercise at least 150 minutes a week (30 minutes a day, 5 days a week). This will help you control your weight and prevent disease.    Limit alcohol to one drink per day.    No smoking.     Wear sunscreen to prevent skin cancer.     See your dentist every six months for an exam and cleaning.    See your eye doctor every 1 to 2 years.            Coreen Mike MD  [facility]

## 2023-06-08 NOTE — PATIENT INSTRUCTIONS
For cough:  - trial of flonase nasal spray for one month. If helpful, can continue long term.   - if flonase not helpful, start Prilosec 20 mg once daily (can message me for Rx) for one month trial.       Cipro as needed for travel.   Don't take calcium near/with the medication.         Preventive Health Recommendations  Female Ages 50 - 64    Yearly exam: See your health care provider every year in order to  Review health changes.   Discuss preventive care.    Review your medicines if your doctor has prescribed any.    Get a Pap test every three years (unless you have an abnormal result and your provider advises testing more often).  If you get Pap tests with HPV test, you only need to test every 5 years, unless you have an abnormal result.   You do not need a Pap test if your uterus was removed (hysterectomy) and you have not had cancer.  You should be tested each year for STDs (sexually transmitted diseases) if you're at risk.   Have a mammogram every 1 to 2 years.  Have a colonoscopy at age 50, or have a yearly FIT test (stool test). These exams screen for colon cancer.    Have a cholesterol test every 5 years, or more often if advised.  Have a diabetes test (fasting glucose) every three years. If you are at risk for diabetes, you should have this test more often.   If you are at risk for osteoporosis (brittle bone disease), think about having a bone density scan (DEXA).    Shots: Get a flu shot each year. Get a tetanus shot every 10 years.    Nutrition:   Eat at least 5 servings of fruits and vegetables each day.  Eat whole-grain bread, whole-wheat pasta and brown rice instead of white grains and rice.  Get adequate Calcium and Vitamin D.     Lifestyle  Exercise at least 150 minutes a week (30 minutes a day, 5 days a week). This will help you control your weight and prevent disease.  Limit alcohol to one drink per day.  No smoking.   Wear sunscreen to prevent skin cancer.   See your dentist every six months for  an exam and cleaning.  See your eye doctor every 1 to 2 years.

## 2023-06-08 NOTE — RESULT ENCOUNTER NOTE
Bin,  Your chest xray was normal. Please continue with the plan we developed in the office for the cough.   Please MyChart or call if you have any concerns or questions.   Sincerely,  Coreen Mike MD     Persistent cough 1. Allergies (pollen)>>Zyrtec, Flonase 2. Cough Variant Asthma>>Singulair, Symbicort 3. Infection>>Viral (Adenovirus, Rhinovirus, RSV, etc) 4. Silent GERD>>Nexium 20mg before breakfast and before supper and keep head of bed elevated

## 2023-06-09 DIAGNOSIS — R74.8 ELEVATED ALKALINE PHOSPHATASE LEVEL: Primary | ICD-10-CM

## 2023-06-09 DIAGNOSIS — E11.9 TYPE 2 DIABETES MELLITUS WITHOUT COMPLICATION, WITHOUT LONG-TERM CURRENT USE OF INSULIN (H): Primary | ICD-10-CM

## 2023-06-09 DIAGNOSIS — R74.8 ELEVATED ALKALINE PHOSPHATASE LEVEL: ICD-10-CM

## 2023-06-09 PROBLEM — M85.80 OSTEOPENIA: Status: ACTIVE | Noted: 2023-06-09

## 2023-06-09 LAB — GGT SERPL-CCNC: 14 U/L (ref 5–36)

## 2023-06-09 NOTE — RESULT ENCOUNTER NOTE
Dear Sudhakar,     Your bone density study showed osteopenia, but has improved since this was last checked.  This is mild loss of bone strength but not to the level of osteoporosis.  I am recommending that you ensure adequate calcium and vitamin D intake as well as exercise.  This amounts to 1200 mg of calcium and 800 international units of vitamin D daily.          Please send a [employer] message or call [phone]  if you have any questions.      ISMAEL Villareal, CNP  [facility]    If you have further questions about the interpretation of your labs, [email or web address] is a good website to check out for further information.

## 2023-06-09 NOTE — RESULT ENCOUNTER NOTE
Unfortunately, I am seeing as I am reviewing your lab results that my original message to did not get sent properly. Sorry!    So, original results to discuss:  - kidney, liver and electrolyte panel was normal except for mild elevation of the alkaline phosphatase level.  This test can go up both with bone health issues and also liver problems.  Given the GGT test was normal, I think it is most likely elevated because of your bone metabolism. (Hopefully this helps the previous message make more sense    -The A1c shows overall your glucose levels are controlled for type 2 diabetes.  Keep working on healthy diet and exercise.    -Your cholesterol panel is quite good overall.  However, when diabetic, we do recommend trying to keep the LDL cholesterol down to 70.  Because of your diabetes and increased heart disease risk with this and your age, you are a candidate to start a statin cholesterol medication to help lower your cardiovascular risk.  People who are very high risk for heart disease such as people who have high blood pressure, obesity, smoking, etc benefit the most from these medications.  However, you may get some mild reduction in cardiovascular risk which might make it worth it.  Let me know if you are interested in starting a statin medication.    -Your thyroid test was normal.    -Finally, I was also thinking about recommending a baby aspirin daily for you.  Because of your diabetes and mildly increased cardiovascular risks, you may benefit from low-dose aspirin 81 mg supplementation daily.  Once again, given you are not high risk your benefit is not as much but it does help modestly lower cardiovascular risks.    Let me know your thoughts if you want to start the statin cholesterol medication or a baby aspirin.  I will place a future order for another alkaline phosphatase level for recheck.  I am not overly worried about this but do think it would be good to recheck and ensure it is not climbing.    Kind  regards,  Coreen Mike MD

## 2023-06-09 NOTE — RESULT ENCOUNTER NOTE
Bin,  The GGT liver test was normal making the elevated alkaline phosphatase more likely to be coming from a bone source.  So, lets make sure you are getting adequate calcium and vitamin D for your bone health.  Aim to intake at least 1200 mg of calcium per day (through diet or supplements), but try not to go over 1500 mg/day (especially if through supplements).   Aim for 1000 international units of vitamin D per day.  Lets plan to recheck the alkaline phosphatase level in 1 month.  Further evaluation could be warranted if it continues to climb.  Please MyChart or call if you have any concerns or questions.   Sincerely,  Coreen Mike MD

## 2023-06-14 ENCOUNTER — MYC MEDICAL ADVICE (OUTPATIENT)
Dept: FAMILY MEDICINE | Facility: CLINIC | Age: 58
End: 2023-06-14
Payer: COMMERCIAL

## 2023-08-09 ENCOUNTER — TELEPHONE (OUTPATIENT)
Dept: FAMILY MEDICINE | Facility: CLINIC | Age: 58
End: 2023-08-09
Payer: COMMERCIAL

## 2023-08-09 DIAGNOSIS — R74.8 ELEVATED ALKALINE PHOSPHATASE LEVEL: ICD-10-CM

## 2023-08-09 DIAGNOSIS — E11.9 TYPE 2 DIABETES MELLITUS WITHOUT COMPLICATION, WITHOUT LONG-TERM CURRENT USE OF INSULIN (H): Primary | ICD-10-CM

## 2023-08-09 NOTE — TELEPHONE ENCOUNTER
Order/Referral Request    Who is requesting: Patient    Orders being requested: Lab A1C, Fasting glucose, and ABL    Reason service is needed/diagnosis: Diabetic    When are orders needed by: 10/5/2023    Has this been discussed with Provider: No    Does patient have a preference on a Group/Provider/Facility? [facility]    Does patient have an appointment scheduled?: Yes: 10/5/2023    Where to send orders: Place orders within Epic    Could we send this information to you in [facility] or would you prefer to receive a phone call?:   Patient would prefer a phone call   Okay to leave a detailed message?: Yes at Cell number on file:    Telephone Information:   Mobile [phone]

## 2023-08-09 NOTE — TELEPHONE ENCOUNTER
Pt has a lab only scheduled 10/5, see below message, asking for orders. Future orders are already in place, spoke to pt, she is also requesting Lipid to be checked.

## 2023-09-10 ENCOUNTER — HEALTH MAINTENANCE LETTER (OUTPATIENT)
Age: 58
End: 2023-09-10

## 2023-10-05 ENCOUNTER — LAB (OUTPATIENT)
Dept: LAB | Facility: CLINIC | Age: 58
End: 2023-10-05
Payer: COMMERCIAL

## 2023-10-05 DIAGNOSIS — R74.8 ELEVATED ALKALINE PHOSPHATASE LEVEL: ICD-10-CM

## 2023-10-05 DIAGNOSIS — E11.9 TYPE 2 DIABETES MELLITUS WITHOUT COMPLICATION, WITHOUT LONG-TERM CURRENT USE OF INSULIN (H): ICD-10-CM

## 2023-10-05 LAB
ALBUMIN SERPL BCG-MCNC: 4.7 G/DL (ref 3.5–5.2)
ALP SERPL-CCNC: 96 U/L (ref 35–104)
ALT SERPL W P-5'-P-CCNC: 17 U/L (ref 0–50)
ANION GAP SERPL CALCULATED.3IONS-SCNC: 11 MMOL/L (ref 7–15)
AST SERPL W P-5'-P-CCNC: 21 U/L (ref 0–45)
BILIRUB DIRECT SERPL-MCNC: 0.22 MG/DL (ref 0–0.3)
BILIRUB SERPL-MCNC: 0.8 MG/DL
BUN SERPL-MCNC: 13.3 MG/DL (ref 6–20)
CALCIUM SERPL-MCNC: 9.5 MG/DL (ref 8.6–10)
CHLORIDE SERPL-SCNC: 102 MMOL/L (ref 98–107)
CHOLEST SERPL-MCNC: 165 MG/DL
CREAT SERPL-MCNC: 0.61 MG/DL (ref 0.51–0.95)
CREAT UR-MCNC: 95.8 MG/DL
DEPRECATED HCO3 PLAS-SCNC: 27 MMOL/L (ref 22–29)
EGFRCR SERPLBLD CKD-EPI 2021: >90 ML/MIN/1.73M2
GLUCOSE SERPL-MCNC: 102 MG/DL (ref 70–99)
HBA1C MFR BLD: 6.6 % (ref 0–5.6)
HDLC SERPL-MCNC: 73 MG/DL
HOLD SPECIMEN: NORMAL
LDLC SERPL CALC-MCNC: 84 MG/DL
MICROALBUMIN UR-MCNC: <12 MG/L
MICROALBUMIN/CREAT UR: NORMAL MG/G{CREAT}
NONHDLC SERPL-MCNC: 92 MG/DL
POTASSIUM SERPL-SCNC: 4.1 MMOL/L (ref 3.4–5.3)
PROT SERPL-MCNC: 7 G/DL (ref 6.4–8.3)
SODIUM SERPL-SCNC: 140 MMOL/L (ref 135–145)
TRIGL SERPL-MCNC: 42 MG/DL

## 2023-10-05 PROCEDURE — 82570 ASSAY OF URINE CREATININE: CPT

## 2023-10-05 PROCEDURE — 83036 HEMOGLOBIN GLYCOSYLATED A1C: CPT

## 2023-10-05 PROCEDURE — 82248 BILIRUBIN DIRECT: CPT

## 2023-10-05 PROCEDURE — 80061 LIPID PANEL: CPT

## 2023-10-05 PROCEDURE — 82043 UR ALBUMIN QUANTITATIVE: CPT

## 2023-10-05 PROCEDURE — 80053 COMPREHEN METABOLIC PANEL: CPT

## 2023-10-05 PROCEDURE — 36415 COLL VENOUS BLD VENIPUNCTURE: CPT

## 2023-10-06 ENCOUNTER — MYC MEDICAL ADVICE (OUTPATIENT)
Dept: FAMILY MEDICINE | Facility: CLINIC | Age: 58
End: 2023-10-06
Payer: COMMERCIAL

## 2023-12-05 ENCOUNTER — TELEPHONE (OUTPATIENT)
Dept: FAMILY MEDICINE | Facility: CLINIC | Age: 58
End: 2023-12-05

## 2023-12-05 NOTE — TELEPHONE ENCOUNTER
Central Prior Authorization Team   Phone: [phone]    Coreen Mike MD15 hours ago (4:26 PM)     AW  Please start PA for Dexcom

## 2023-12-05 NOTE — TELEPHONE ENCOUNTER
"A Prior Authorization has been started.  To submit the PA, please follow the instructions below:    Login to [email or web address] and \"enter a Key\"  KEY: [id number]  Last Name: Zarate  : 1965      "

## 2023-12-06 ENCOUNTER — ANCILLARY PROCEDURE (OUTPATIENT)
Dept: MAMMOGRAPHY | Facility: CLINIC | Age: 58
End: 2023-12-06
Attending: FAMILY MEDICINE
Payer: COMMERCIAL

## 2023-12-06 DIAGNOSIS — Z12.31 VISIT FOR SCREENING MAMMOGRAM: ICD-10-CM

## 2023-12-06 PROCEDURE — 77063 BREAST TOMOSYNTHESIS BI: CPT | Mod: TC | Performed by: STUDENT IN AN ORGANIZED HEALTH CARE EDUCATION/TRAINING PROGRAM

## 2023-12-06 PROCEDURE — 77067 SCR MAMMO BI INCL CAD: CPT | Mod: TC | Performed by: STUDENT IN AN ORGANIZED HEALTH CARE EDUCATION/TRAINING PROGRAM

## 2023-12-07 NOTE — TELEPHONE ENCOUNTER
Central Prior Authorization Team   Phone: [phone]    PA Initiation    Medication: DEXCOM G6 TRANSMITTER MISC  Insurance Company: [employer] - Phone [phone] Fax [phone]  Pharmacy Filling the Rx: [id number] [place], [place] - [address] AT [facility]  Filling Pharmacy Phone: [phone]  Filling Pharmacy Fax:    Start Date: 12/7/2023

## 2023-12-07 NOTE — TELEPHONE ENCOUNTER
PRIOR AUTHORIZATION DENIED    Medication: DEXCOM G6 TRANSMITTER MISC  Insurance Company: [employer] - Phone [phone] Fax [phone]  Denial Date: 12/7/2023  Denial Reason(s):           Appeal Information:         Patient Notified: No

## 2024-01-28 ENCOUNTER — HEALTH MAINTENANCE LETTER (OUTPATIENT)
Age: 59
End: 2024-01-28

## 2024-02-23 DIAGNOSIS — E11.9 TYPE 2 DIABETES MELLITUS WITHOUT COMPLICATION, WITHOUT LONG-TERM CURRENT USE OF INSULIN (H): Primary | ICD-10-CM

## 2024-02-23 NOTE — TELEPHONE ENCOUNTER
Patient calling in and requesting to send the sensor for the Dexcom G6. If prior auth is needed for the sensor, patient may just contact Dexcom directly for a free trial.    Sensor and pharmacy pended below.    Cesia Rodriguez RN

## 2024-02-24 RX ORDER — PROCHLORPERAZINE 25 MG/1
SUPPOSITORY RECTAL
Qty: 3 EACH | Refills: 5 | Status: SHIPPED | OUTPATIENT
Start: 2024-02-24 | End: 2024-05-29

## 2024-03-19 ENCOUNTER — TELEPHONE (OUTPATIENT)
Dept: FAMILY MEDICINE | Facility: CLINIC | Age: 59
End: 2024-03-19
Payer: COMMERCIAL

## 2024-03-19 DIAGNOSIS — E11.9 TYPE 2 DIABETES MELLITUS WITHOUT COMPLICATION, WITHOUT LONG-TERM CURRENT USE OF INSULIN (H): Primary | ICD-10-CM

## 2024-03-19 DIAGNOSIS — R74.8 ELEVATED ALKALINE PHOSPHATASE LEVEL: ICD-10-CM

## 2024-03-19 NOTE — TELEPHONE ENCOUNTER
Called pt to reschedule appt on 5/30 as PCP is out of the clinic this day. Pt stated she bought a plane ticket already this week to be home for this appointment. She will only be here from 5/27-5/31 pt asking if she can still be seen this week. Also pt wanted to see if she could get her labs drawn a couple days before her so that she doesn't have to wait until afternoon to fast.     Would it be ok to r/s this pt using a SD slot on 5/29 and are you able to place lab orders so pt can get these drawn before appt?

## 2024-05-26 SDOH — HEALTH STABILITY: PHYSICAL HEALTH: ON AVERAGE, HOW MANY MINUTES DO YOU ENGAGE IN EXERCISE AT THIS LEVEL?: 50 MIN

## 2024-05-26 SDOH — HEALTH STABILITY: PHYSICAL HEALTH: ON AVERAGE, HOW MANY DAYS PER WEEK DO YOU ENGAGE IN MODERATE TO STRENUOUS EXERCISE (LIKE A BRISK WALK)?: 6 DAYS

## 2024-05-26 ASSESSMENT — SOCIAL DETERMINANTS OF HEALTH (SDOH): HOW OFTEN DO YOU GET TOGETHER WITH FRIENDS OR RELATIVES?: ONCE A WEEK

## 2024-05-28 ENCOUNTER — LAB (OUTPATIENT)
Dept: LAB | Facility: CLINIC | Age: 59
End: 2024-05-28
Payer: COMMERCIAL

## 2024-05-28 DIAGNOSIS — R74.8 ELEVATED ALKALINE PHOSPHATASE LEVEL: ICD-10-CM

## 2024-05-28 DIAGNOSIS — E11.9 TYPE 2 DIABETES MELLITUS WITHOUT COMPLICATION, WITHOUT LONG-TERM CURRENT USE OF INSULIN (H): Primary | ICD-10-CM

## 2024-05-28 LAB
ALBUMIN SERPL BCG-MCNC: 4.9 G/DL (ref 3.5–5.2)
ALP SERPL-CCNC: 106 U/L (ref 40–150)
ALT SERPL W P-5'-P-CCNC: 10 U/L (ref 0–50)
ANION GAP SERPL CALCULATED.3IONS-SCNC: 10 MMOL/L (ref 7–15)
AST SERPL W P-5'-P-CCNC: 17 U/L (ref 0–45)
BILIRUB SERPL-MCNC: 0.7 MG/DL
BUN SERPL-MCNC: 14.5 MG/DL (ref 6–20)
CALCIUM SERPL-MCNC: 9.6 MG/DL (ref 8.6–10)
CHLORIDE SERPL-SCNC: 103 MMOL/L (ref 98–107)
CREAT SERPL-MCNC: 0.55 MG/DL (ref 0.51–0.95)
DEPRECATED HCO3 PLAS-SCNC: 27 MMOL/L (ref 22–29)
EGFRCR SERPLBLD CKD-EPI 2021: >90 ML/MIN/1.73M2
GLUCOSE SERPL-MCNC: 108 MG/DL (ref 70–99)
HBA1C MFR BLD: 6 % (ref 0–5.6)
POTASSIUM SERPL-SCNC: 3.9 MMOL/L (ref 3.4–5.3)
PROT SERPL-MCNC: 7.2 G/DL (ref 6.4–8.3)
SODIUM SERPL-SCNC: 140 MMOL/L (ref 135–145)

## 2024-05-28 PROCEDURE — 36415 COLL VENOUS BLD VENIPUNCTURE: CPT

## 2024-05-28 PROCEDURE — 80053 COMPREHEN METABOLIC PANEL: CPT

## 2024-05-28 PROCEDURE — 83036 HEMOGLOBIN GLYCOSYLATED A1C: CPT

## 2024-05-28 NOTE — RESULT ENCOUNTER NOTE
Bin,  Your A1c is looking much better!  The liver,  kidney and electrolyte panel was normal except for your glucose.  Not sure if you were fasting at the time but if you were the glucose was in the prediabetic range.  Talk with you soon!  Please MyChart or call if you have any concerns or questions.   Sincerely,  Coreen Mike MD

## 2024-05-29 ENCOUNTER — OFFICE VISIT (OUTPATIENT)
Dept: FAMILY MEDICINE | Facility: CLINIC | Age: 59
End: 2024-05-29
Payer: COMMERCIAL

## 2024-05-29 VITALS
OXYGEN SATURATION: 99 % | WEIGHT: 132.5 LBS | RESPIRATION RATE: 12 BRPM | SYSTOLIC BLOOD PRESSURE: 108 MMHG | BODY MASS INDEX: 22.62 KG/M2 | HEART RATE: 86 BPM | DIASTOLIC BLOOD PRESSURE: 73 MMHG | TEMPERATURE: 98.2 F | HEIGHT: 64 IN

## 2024-05-29 DIAGNOSIS — Z12.4 CERVICAL CANCER SCREENING: ICD-10-CM

## 2024-05-29 DIAGNOSIS — Z00.00 ROUTINE GENERAL MEDICAL EXAMINATION AT A HEALTH CARE FACILITY: Primary | ICD-10-CM

## 2024-05-29 DIAGNOSIS — Z11.1 SCREENING FOR TUBERCULOSIS: ICD-10-CM

## 2024-05-29 DIAGNOSIS — E11.9 TYPE 2 DIABETES MELLITUS WITHOUT COMPLICATION, WITHOUT LONG-TERM CURRENT USE OF INSULIN (H): ICD-10-CM

## 2024-05-29 PROCEDURE — 99207 PR FOOT EXAM NO CHARGE: CPT | Performed by: FAMILY MEDICINE

## 2024-05-29 PROCEDURE — G0145 SCR C/V CYTO,THINLAYER,RESCR: HCPCS | Performed by: FAMILY MEDICINE

## 2024-05-29 PROCEDURE — 36415 COLL VENOUS BLD VENIPUNCTURE: CPT | Performed by: FAMILY MEDICINE

## 2024-05-29 PROCEDURE — 99396 PREV VISIT EST AGE 40-64: CPT | Performed by: FAMILY MEDICINE

## 2024-05-29 PROCEDURE — 86481 TB AG RESPONSE T-CELL SUSP: CPT | Performed by: FAMILY MEDICINE

## 2024-05-29 PROCEDURE — 87624 HPV HI-RISK TYP POOLED RSLT: CPT | Performed by: FAMILY MEDICINE

## 2024-05-29 ASSESSMENT — PAIN SCALES - GENERAL: PAINLEVEL: MILD PAIN (3)

## 2024-05-29 NOTE — PATIENT INSTRUCTIONS
"Preventive Care Advice   This is general advice we often give to help people stay healthy. Your care team may have specific advice just for you. Please talk to your care team about your own preventive care needs.  Lifestyle  Exercise at least 150 minutes each week (30 minutes a day, 5 days a week).  Do muscle strengthening activities 2 days a week. These help control your weight and prevent disease.  No smoking.  Wear sunscreen to prevent skin cancer.  Have your home tested for radon every 2 to 5 years. Radon is a colorless, odorless gas that can harm your lungs. To learn more, go to www.health.[facility].mn. and search for \"Radon in Homes.\"  Keep guns unloaded and locked up in a safe place like a safe or gun vault, or, use a gun lock and hide the keys. Always lock away bullets separately. To learn more, visit [employer].com.[place].gov and search for \"safe gun storage.\"  Nutrition  Eat 5 or more servings of fruits and vegetables each day.  Try wheat bread, brown rice and whole grain pasta (instead of white bread, rice, and pasta).  Get enough calcium and vitamin D. Check the label on foods and aim for 100% of the RDA (recommended daily allowance).  Regular exams  Have a dental exam and cleaning every 6 months.  See your health care team every year to talk about:  Any changes in your health.  Any medicines your care team has prescribed.  Preventive care, family planning, and ways to prevent chronic diseases.  Shots (vaccines)   HPV shots (up to age 26), if you've never had them before.  Hepatitis B shots (up to age 59), if you've never had them before.  COVID-19 shot: Get this shot when it's due.  Flu shot: Get a flu shot every year.  Tetanus shot: Get a tetanus shot every 10 years.  Pneumococcal, hepatitis A, and RSV shots: Ask your care team if you need these based on your risk.  Shingles shot (for age 50 and up).  General health tests  Diabetes screening:  Starting at age 35, Get screened for diabetes at least every 3 years.  If " you are younger than age 35, ask your care team if you should be screened for diabetes.  Cholesterol test: At age 39, start having a cholesterol test every 5 years, or more often if advised.  Bone density scan (DEXA): At age 50, ask your care team if you should have this scan for osteoporosis (brittle bones).  Hepatitis C: Get tested at least once in your life.  Abdominal aortic aneurysm screening: Talk to your doctor about having this screening if you:  Have ever smoked; and  Are biologically male; and  Are between the ages of 65 and 75.  STIs (sexually transmitted infections)  Before age 24: Ask your care team if you should be screened for STIs.  After age 24: Get screened for STIs if you're at risk. You are at risk for STIs (including HIV) if:  You are sexually active with more than one person.  You don't use condoms every time.  You or a partner was diagnosed with a sexually transmitted infection.  If you are at risk for HIV, ask about PrEP medicine to prevent HIV.  Get tested for HIV at least once in your life, whether you are at risk for HIV or not.  Cancer screening tests  Cervical cancer screening: If you have a cervix, begin getting regular cervical cancer screening tests at age 21. Most people who have regular screenings with normal results can stop after age 65. Talk about this with your provider.  Breast cancer scan (mammogram): If you've ever had breasts, begin having regular mammograms starting at age 40. This is a scan to check for breast cancer.  Colon cancer screening: It is important to start screening for colon cancer at age 45.  Have a colonoscopy test every 10 years (or more often if you're at risk) Or, ask your provider about stool tests like a FIT test every year or Cologuard test every 3 years.  To learn more about your testing options, visit: www.[employer]/974779.pdf.  For help making a decision, visit: serenity/ff70546.  Prostate cancer screening test: If you have a prostate and are age 55  to 69, ask your provider if you would benefit from a yearly prostate cancer screening test.  Lung cancer screening: If you are a current or former smoker age 50 to 80, ask your care team if ongoing lung cancer screenings are right for you.  For informational purposes only. Not to replace the advice of your health care provider. Copyright   2023 [employer]. All rights reserved. Clinically reviewed by the [facility]. CID[employer] 375643 - REV 04/24.    Learning About Stress  What is stress?     Stress is your body's response to a hard situation. Your body can have a physical, emotional, or mental response. Stress is a fact of life for most people, and it affects everyone differently. What causes stress for you may not be stressful for someone else.  A lot of things can cause stress. You may feel stress when you go on a job interview, take a test, or run a race. This kind of short-term stress is normal and even useful. It can help you if you need to work hard or react quickly. For example, stress can help you finish an important job on time.  Long-term stress is caused by ongoing stressful situations or events. Examples of long-term stress include long-term health problems, ongoing problems at work, or conflicts in your family. Long-term stress can harm your health.  How does stress affect your health?  When you are stressed, your body responds as though you are in danger. It makes hormones that speed up your heart, make you breathe faster, and give you a burst of energy. This is called the fight-or-flight stress response. If the stress is over quickly, your body goes back to normal and no harm is done.  But if stress happens too often or lasts too long, it can have bad effects. Long-term stress can make you more likely to get sick, and it can make symptoms of some diseases worse. If you tense up when you are stressed, you may develop neck, shoulder, or low back pain. Stress is  linked to high blood pressure and heart disease.  Stress also harms your emotional health. It can make you morton, tense, or depressed. Your relationships may suffer, and you may not do well at work or school.  What can you do to manage stress?  You can try these things to help manage stress:   Do something active. Exercise or activity can help reduce stress. Walking is a great way to get started. Even everyday activities such as housecleaning or yard work can help.  Try yoga or mitra chi. These techniques combine exercise and meditation. You may need some training at first to learn them.  Do something you enjoy. For example, listen to music or go to a movie. Practice your hobby or do volunteer work.  Meditate. This can help you relax, because you are not worrying about what happened before or what may happen in the future.  Do guided imagery. Imagine yourself in any setting that helps you feel calm. You can use online videos, books, or a teacher to guide you.  Do breathing exercises. For example:  From a standing position, bend forward from the waist with your knees slightly bent. Let your arms dangle close to the floor.  Breathe in slowly and deeply as you return to a standing position. Roll up slowly and lift your head last.  Hold your breath for just a few seconds in the standing position.  Breathe out slowly and bend forward from the waist.  Let your feelings out. Talk, laugh, cry, and express anger when you need to. Talking with supportive friends or family, a counselor, or a maximilian leader about your feelings is a healthy way to relieve stress. Avoid discussing your feelings with people who make you feel worse.  Write. It may help to write about things that are bothering you. This helps you find out how much stress you feel and what is causing it. When you know this, you can find better ways to cope.  What can you do to prevent stress?  You might try some of these things to help prevent stress:  Manage your time.  "This helps you find time to do the things you want and need to do.  Get enough sleep. Your body recovers from the stresses of the day while you are sleeping.  Get support. Your family, friends, and community can make a difference in how you experience stress.  Limit your news feed. Avoid or limit time on social media or news that may make you feel stressed.  Do something active. Exercise or activity can help reduce stress. Walking is a great way to get started.  Where can you learn more?  Go to https://www.[employer].net/patiented  Enter N032 in the search box to learn more about \"Learning About Stress.\"  Current as of: October 24, 2023               Content Version: 14.0    7886-0937 [employer].   Care instructions adapted under license by your healthcare professional. If you have questions about a medical condition or this instruction, always ask your healthcare professional. [employer] disclaims any warranty or liability for your use of this information.      "

## 2024-05-29 NOTE — PROGRESS NOTES
Preventive Care Visit  [facility]  Coreen Ale Mike MD, Family Medicine  May 29, 2024      Assessment & Plan     Routine general medical examination at a health care facility    Type 2 diabetes mellitus without complication, without long-term current use of insulin (H)  Excellent control with dietary adjustments. Has declined statin, asa, ACE-I but will reconsider if glucose/A1c rises again. Eye exam scheduled tomorrow  - FOOT EXAM    Cervical cancer screening  - Pap Screen with HPV - Recommended Age 30 - 65 Years    Screening for tuberculosis  Travels to [place] frequently to visit family. No recent screening. No sx's.   - Quantiferon TB Gold Plus; Future  - Quantiferon TB Gold Plus; Future  - Quantiferon TB Gold Plus            Counseling  Appropriate preventive services were discussed with this patient, including applicable screening as appropriate for fall prevention, nutrition, physical activity, Tobacco-use cessation, weight loss and cognition.  Checklist reviewing preventive services available has been given to the patient.  Reviewed patient's diet, addressing concerns and/or questions.   She is at risk for psychosocial distress and has been provided with information to reduce risk.       See Patient Instructions    Brook De La Fuente is a 59 year old, presenting for the following:  Physical        5/29/2024     1:26 PM   Additional Questions   Roomed by Mirna FREEMAN   Accompanied by self         5/29/2024     1:26 PM   Patient Reported Additional Medications   Patient reports taking the following new medications None        Health Care Directive  Patient does not have a Health Care Directive or Living Will: Discussed advance care planning with patient; information given to patient to review.    Healthy Habits:     Getting at least 3 servings of Calcium per day:  Yes    Bi-annual eye exam:  NO    Dental care twice a year:  Yes    Sleep apnea or symptoms of sleep apnea:  None    Diet:   Carbohydrate counting    Frequency of exercise:  6-7 days/week    Duration of exercise:  45-60 minutes    Taking medications regularly:  Yes    Barriers to taking medications:  None    Medication side effects:  None    Additional concerns today:  No    Wore dexcom for few months and really was able to identify what was increasing her glucose. Has been able to change diet, cutting out most carbs except veggies/fruits    Eye exam tomorrow        5/26/2024   General Health   How would you rate your overall physical health? Good   Feel stress (tense, anxious, or unable to sleep) Only a little   (!) STRESS CONCERN      5/26/2024   Nutrition   Three or more servings of calcium each day? Yes   Diet: Other   If other, please elaborate: No carbs   How many servings of fruit and vegetables per day? 4 or more   How many sweetened beverages each day? 0-1         5/26/2024   Exercise   Days per week of moderate/strenous exercise 6 days   Average minutes spent exercising at this level 50 min         5/26/2024   Social Factors   Frequency of gathering with friends or relatives Once a week   Worry food won't last until get money to buy more No   Food not last or not have enough money for food? No   Do you have housing?  Yes   Are you worried about losing your housing? No   Lack of transportation? No   Unable to get utilities (heat,electricity)? No         5/26/2024   Fall Risk   Fallen 2 or more times in the past year? No   Trouble with walking or balance? No          5/26/2024   Dental   Dentist two times every year? Yes         5/26/2024   TB Screening   Were you born outside of the [place]? Yes         Today's PHQ-2 Score:       5/29/2024     1:17 PM   PHQ-2 ( 1999 [employer])   Q1: Little interest or pleasure in doing things 0   Q2: Feeling down, depressed or hopeless 0   PHQ-2 Score 0   Q1: Little interest or pleasure in doing things Not at all   Q2: Feeling down, depressed or hopeless Not at all   PHQ-2 Score 0           5/26/2024    Substance Use   Alcohol more than 3/day or more than 7/wk Not Applicable   Do you use any other substances recreationally? No     Social History     Tobacco Use    Smoking status: Never     Passive exposure: Never    Smokeless tobacco: Never   Vaping Use    Vaping status: Never Used   Substance Use Topics    Alcohol use: Not Currently     Comment: rare    Drug use: No             5/26/2024   Breast Cancer Screening   Family history of breast, colon, or ovarian cancer? No / Unknown         12/6/2023   LAST FHS-7 RESULTS   1st degree relative breast or ovarian cancer No   Any relative bilateral breast cancer No   Any male have breast cancer No   Any ONE woman have BOTH breast AND ovarian cancer No   Any woman with breast cancer before 50yrs No   2 or more relatives with breast AND/OR ovarian cancer No   2 or more relatives with breast AND/OR bowel cancer No        Mammogram Screening - Mammogram every 1-2 years updated in Health Maintenance based on mutual decision making        5/26/2024   STI Screening   New sexual partner(s) since last STI/HIV test? No     History of abnormal Pap smear: YES - reflected in Problem List and Health Maintenance accordingly        Latest Ref Rng & Units 9/30/2019     7:39 AM 9/30/2019     7:33 AM 12/2/2016     8:24 AM   PAP / HPV   PAP (Historical)  NIL      HPV 16 DNA NEG^Negative  Negative  Negative    HPV 18 DNA NEG^Negative  Negative  Negative    Other HR HPV NEG^Negative  Negative  Negative      ASCVD Risk   The 10-year ASCVD risk score (Gloria GARY, et al., 2019) is: 2.5%    Values used to calculate the score:      Age: 59 years      Sex: Female      Is Non- : No      Diabetic: Yes      Tobacco smoker: No      Systolic Blood Pressure: 108 mmHg      Is BP treated: No      HDL Cholesterol: 73 mg/dL      Total Cholesterol: 165 mg/dL           Reviewed and updated as needed this visit by Provider                        Review of  "Systems  Constitutional, neuro, ENT, endocrine, pulmonary, cardiac, gastrointestinal, genitourinary, musculoskeletal, integument and psychiatric systems are negative, except as otherwise noted.    Declines statin, asa, ace-I     Objective    Exam  /73 (BP Location: Right arm, Patient Position: Sitting, Cuff Size: Adult Large)   Pulse 86   Temp 98.2  F (36.8  C) (Oral)   Resp 12   Ht 1.619 m (5' 3.75\")   Wt 60.1 kg (132 lb 8 oz)   LMP 04/10/2015 (Approximate)   SpO2 99%   BMI 22.92 kg/m     Estimated body mass index is 22.92 kg/m  as calculated from the following:    Height as of this encounter: 1.619 m (5' 3.75\").    Weight as of this encounter: 60.1 kg (132 lb 8 oz).    Physical Exam  GENERAL: alert and no distress  EYES: Eyes grossly normal to inspection, PERRL and conjunctivae and sclerae normal  HENT: ear canals and TM's normal, nose and mouth without ulcers or lesions  NECK: no adenopathy, no asymmetry, masses, or scars  RESP: lungs clear to auscultation - no rales, rhonchi or wheezes  BREAST: normal without masses, tenderness or nipple discharge and no palpable axillary masses or adenopathy  CV: regular rate and rhythm, normal S1 S2, no S3 or S4, no murmur, click or rub, no peripheral edema  ABDOMEN: soft, nontender, no hepatosplenomegaly, no masses and bowel sounds normal   (female) w/bimanual: normal female external genitalia, normal urethral meatus, normal vaginal mucosa, and normal cervix/adnexa/uterus without masses or discharge  MS: no gross musculoskeletal defects noted, no edema  SKIN: no suspicious lesions or rashes  NEURO: Normal strength and tone, mentation intact and speech normal  PSYCH: mentation appears normal, affect normal/bright  FOOT: no trophic skin changes, nails are thickened/painted. Normal pulses. Normal sensation to fine filament.     Component      Latest Ref Rng 5/28/2024  9:52 AM   Sodium      135 - 145 mmol/L 140    Potassium      3.4 - 5.3 mmol/L 3.9    Carbon " Dioxide (CO2)      22 - 29 mmol/L 27    Anion Gap      7 - 15 mmol/L 10    Urea Nitrogen      6.0 - 20.0 mg/dL 14.5    Creatinine      0.51 - 0.95 mg/dL 0.55    GFR Estimate      >60 mL/min/1.73m2 >90    Calcium      8.6 - 10.0 mg/dL 9.6    Chloride      98 - 107 mmol/L 103    Glucose      70 - 99 mg/dL 108 (H)    Alkaline Phosphatase      40 - 150 U/L 106    AST      0 - 45 U/L 17    ALT      0 - 50 U/L 10    Protein Total      6.4 - 8.3 g/dL 7.2    Albumin      3.5 - 5.2 g/dL 4.9    Bilirubin Total      <=1.2 mg/dL 0.7    Hemoglobin A1C      0.0 - 5.6 % 6.0 (H)       Legend:  (H) High    Signed Electronically by: Coreen Mike MD

## 2024-05-30 ENCOUNTER — TRANSFERRED RECORDS (OUTPATIENT)
Dept: MULTI SPECIALTY CLINIC | Facility: CLINIC | Age: 59
End: 2024-05-30

## 2024-05-30 LAB
HPV HR 12 DNA CVX QL NAA+PROBE: NEGATIVE
HPV16 DNA CVX QL NAA+PROBE: NEGATIVE
HPV18 DNA CVX QL NAA+PROBE: NEGATIVE
HUMAN PAPILLOMA VIRUS FINAL DIAGNOSIS: NORMAL
RETINOPATHY: NORMAL

## 2024-05-31 LAB
GAMMA INTERFERON BACKGROUND BLD IA-ACNC: 0.04 IU/ML
M TB IFN-G BLD-IMP: NEGATIVE
M TB IFN-G CD4+ BCKGRND COR BLD-ACNC: 9.96 IU/ML
MITOGEN IGNF BCKGRD COR BLD-ACNC: 0.01 IU/ML
MITOGEN IGNF BCKGRD COR BLD-ACNC: 0.01 IU/ML
QUANTIFERON MITOGEN: 10 IU/ML
QUANTIFERON NIL TUBE: 0.04 IU/ML
QUANTIFERON TB1 TUBE: 0.05 IU/ML
QUANTIFERON TB2 TUBE: 0.05

## 2024-05-31 NOTE — RESULT ENCOUNTER NOTE
Dear Sudhakar Mike is out of the office and I am reviewing your results.   Your tuberculosis screening test was negative.   Please call or MyChart my office with any questions or concerns.   Dunia Herron, PAC

## 2024-06-03 LAB
BKR LAB AP GYN ADEQUACY: NORMAL
BKR LAB AP GYN INTERPRETATION: NORMAL
BKR LAB AP PREVIOUS ABNORMAL: NORMAL
PATH REPORT.COMMENTS IMP SPEC: NORMAL
PATH REPORT.COMMENTS IMP SPEC: NORMAL
PATH REPORT.RELEVANT HX SPEC: NORMAL

## 2024-10-15 ENCOUNTER — MYC MEDICAL ADVICE (OUTPATIENT)
Dept: FAMILY MEDICINE | Facility: CLINIC | Age: 59
End: 2024-10-15
Payer: COMMERCIAL

## 2024-10-15 DIAGNOSIS — M54.50 LOW BACK PAIN, UNSPECIFIED BACK PAIN LATERALITY, UNSPECIFIED CHRONICITY, UNSPECIFIED WHETHER SCIATICA PRESENT: Primary | ICD-10-CM

## 2024-10-16 NOTE — TELEPHONE ENCOUNTER
Patient is in [place] until 10/23. She has scheduled appointments in [facility]) for 10/24, 13/31 and 11/7. She would need referral letter prior to appointment but patient is not in [place]. Please advise.

## 2024-10-30 ENCOUNTER — DOCUMENTATION ONLY (OUTPATIENT)
Dept: FAMILY MEDICINE | Facility: CLINIC | Age: 59
End: 2024-10-30
Payer: COMMERCIAL

## 2024-11-03 ENCOUNTER — HEALTH MAINTENANCE LETTER (OUTPATIENT)
Age: 59
End: 2024-11-03

## 2024-11-04 ENCOUNTER — HOSPITAL ENCOUNTER (OUTPATIENT)
Dept: MAMMOGRAPHY | Facility: CLINIC | Age: 59
Discharge: HOME | End: 2024-11-04
Attending: FAMILY MEDICINE | Admitting: FAMILY MEDICINE
Payer: COMMERCIAL

## 2024-11-04 DIAGNOSIS — Z12.31 VISIT FOR SCREENING MAMMOGRAM: ICD-10-CM

## 2024-11-04 PROCEDURE — 77063 BREAST TOMOSYNTHESIS BI: CPT

## 2024-11-07 ENCOUNTER — LAB (OUTPATIENT)
Dept: LAB | Facility: CLINIC | Age: 59
End: 2024-11-07
Payer: COMMERCIAL

## 2024-11-07 DIAGNOSIS — E11.9 TYPE 2 DIABETES MELLITUS WITHOUT COMPLICATION, WITHOUT LONG-TERM CURRENT USE OF INSULIN (H): Primary | ICD-10-CM

## 2024-11-07 LAB
CHOLEST SERPL-MCNC: 172 MG/DL
CREAT UR-MCNC: 20.7 MG/DL
EST. AVERAGE GLUCOSE BLD GHB EST-MCNC: 131 MG/DL
FASTING STATUS PATIENT QL REPORTED: YES
HBA1C MFR BLD: 6.2 % (ref 0–5.6)
HDLC SERPL-MCNC: 79 MG/DL
LDLC SERPL CALC-MCNC: 86 MG/DL
MICROALBUMIN UR-MCNC: <12 MG/L
MICROALBUMIN/CREAT UR: NORMAL MG/G{CREAT}
NONHDLC SERPL-MCNC: 93 MG/DL
TRIGL SERPL-MCNC: 34 MG/DL

## 2024-11-07 PROCEDURE — 80061 LIPID PANEL: CPT

## 2024-11-07 PROCEDURE — 83036 HEMOGLOBIN GLYCOSYLATED A1C: CPT

## 2024-11-07 PROCEDURE — 82570 ASSAY OF URINE CREATININE: CPT

## 2024-11-07 PROCEDURE — 36415 COLL VENOUS BLD VENIPUNCTURE: CPT

## 2024-11-07 PROCEDURE — 82043 UR ALBUMIN QUANTITATIVE: CPT

## 2024-11-08 NOTE — RESULT ENCOUNTER NOTE
Hi Bin,  A1c is up a tiny bit from last check but still shows very good overall control with your diabetes.  Urine protein is normal.  Cholesterol panel looks pretty good.  See you soon,  Coreen Mike MD

## 2024-11-12 ENCOUNTER — VIRTUAL VISIT (OUTPATIENT)
Dept: FAMILY MEDICINE | Facility: CLINIC | Age: 59
End: 2024-11-12
Payer: COMMERCIAL

## 2024-11-12 DIAGNOSIS — E11.9 TYPE 2 DIABETES MELLITUS WITHOUT COMPLICATION, WITHOUT LONG-TERM CURRENT USE OF INSULIN (H): Primary | ICD-10-CM

## 2024-11-12 DIAGNOSIS — R03.0 ELEVATED BLOOD PRESSURE READING WITHOUT DIAGNOSIS OF HYPERTENSION: ICD-10-CM

## 2024-11-12 PROCEDURE — G2211 COMPLEX E/M VISIT ADD ON: HCPCS | Mod: 95 | Performed by: FAMILY MEDICINE

## 2024-11-12 PROCEDURE — 99214 OFFICE O/P EST MOD 30 MIN: CPT | Mod: 95 | Performed by: FAMILY MEDICINE

## 2024-11-12 NOTE — PROGRESS NOTES
"  If patient has telephone visit, have they been educated on video visit as preferred visit method and offered to change to video visit? N/A        Instructions Relayed to Patient by Virtual Roomer:     Patient is active on [employer]:   Relayed following to patient: \"It looks like you are active on [employer], are you able to join the visit this way? If not, do you need us to send you a link now or would you like your provider to send a link via text or email when they are ready to initiate the visit?\"      Patient Confirmed they will join visit via: [employer]  Reminded patient to ensure they were logged on to virtual visit by arrival time listed.   Asked if patient has flexibility to initiate visit sooner than arrival time: patient is unable to initiate visit earlier than arrival time     If pediatric virtual visit, ensured pediatric patient along with parent/guardian will be present for video visit.     Patient offered the website [email or web address] and/or phone number to [employer] Help line: [phone]    Sudhakar is a 59 year old who is being evaluated via a billable video visit.    How would you like to obtain your AVS? [email or web address]  If the video visit is dropped, the invitation should be resent by: Text to cell phone: [phone]  Will anyone else be joining your video visit? No      Assessment & Plan     Type 2 diabetes mellitus without complication, without long-term current use of insulin (H)  Overall is extremely well-controlled with diet alone.  She still prefers to stay off additional medications (statin,asa, acei) for now given her low A1c.  We discussed the importance of cardiovascular, renal, nerve disease prevention.  She will continue to get annual dilated eye exam    Elevated blood pressure reading without diagnosis of hypertension  Reporting fairly new that she has had some elevated pressures when first walking into doctors offices.  Her usual blood pressure is quite normal.  We discussed the " importance of blood pressures at rest and the goals for healthy pressure readings.  Definitely would want to start an ACE inhibitor/ARB if pressure is starting to trend up.  She will continue to monitor closely at home and will also bring her blood pressure cuff in for comparison read.    Follow-up in 6 months with physical and fasting labs planned                Subjective   Sudhakar is a 59 year old, presenting for the following health issues:  Diabetes (Follow up)      11/12/2024     9:05 AM   Additional Questions   Roomed by Ceci HARVEY   Accompanied by Self         11/12/2024     9:05 AM   Patient Reported Additional Medications   Patient reports taking the following new medications None     Via the Health Maintenance questionnaire, the patient has reported the following services have been completed -Eye Exam: [facility] 2024-05-30, this information has been sent to the abstraction team.  History of Present Illness       Diabetes:   She presents for follow up of diabetes.    She is not checking blood glucose.        She is concerned about other.    She is not experiencing numbness or burning in feet, excessive thirst, blurry vision, weight changes or redness, sores or blisters on feet. The patient has not had a diabetic eye exam in the last 12 months.          She eats 2-3 servings of fruits and vegetables daily.She consumes 0 sweetened beverage(s) daily.She exercises with enough effort to increase her heart rate 30 to 60 minutes per day.  She exercises with enough effort to increase her heart rate 6 days per week.   She is taking medications regularly.     Overall doing well.   Trip to [place] and ate more than usual over that time.   In [place] to help with her daughters baby.     Has declined statin, asa, ACE-I but will reconsider if glucose/A1c rises again/high bp. Eye exam scheduled tomorrow     Got mammogram completed.     Saw PHYSICAL THERAPY at [facility] in [place] and is working home exercises to  strengthen muscles for back. Will see them one more time prior to going back to [place]. Back pain worsened with travel but doing better now.     No CP, palp, or dyspnea.   Dry skin in feet more moisturizing more.     Occ white coat 130-140/80-90's readings in offices but these readings are generally not at rest.              Objective           Vitals:  No vitals were obtained today due to virtual visit.    Physical Exam   GENERAL: alert and no distress  EYES: Eyes grossly normal to inspection.  No discharge or erythema, or obvious scleral/conjunctival abnormalities.  RESP: No audible wheeze, cough, or visible cyanosis.    SKIN: Visible skin clear. No significant rash, abnormal pigmentation or lesions.  NEURO: Cranial nerves grossly intact.  Mentation and speech appropriate for age.  PSYCH: Appropriate affect, tone, and pace of words    Recent Results (from the past 720 hours)   HEMOGLOBIN A1C    Collection Time: 11/07/24  8:53 AM   Result Value Ref Range    Estimated Average Glucose 131 (H) <117 mg/dL    Hemoglobin A1C 6.2 (H) 0.0 - 5.6 %   Lipid panel reflex to direct LDL Non-fasting    Collection Time: 11/07/24  8:53 AM   Result Value Ref Range    Cholesterol 172 <200 mg/dL    Triglycerides 34 <150 mg/dL    Direct Measure HDL 79 >=50 mg/dL    LDL Cholesterol Calculated 86 <100 mg/dL    Non HDL Cholesterol 93 <130 mg/dL    Patient Fasting > 8hrs? Yes    Albumin Random Urine Quantitative with Creat Ratio    Collection Time: 11/07/24  9:03 AM   Result Value Ref Range    Creatinine Urine mg/dL 20.7 mg/dL    Albumin Urine mg/L <12.0 mg/L    Albumin Urine mg/g Cr             Video-Visit Details    Type of service:  Video Visit   Originating Location (pt. Location): Home    Distant Location (provider location):  Off-site  Platform used for Video Visit: Charlene  Signed Electronically by: Coreen Mike MD

## 2024-11-12 NOTE — PATIENT INSTRUCTIONS
Monitor blood pressure. Goal blood pressure  is < 140/90 but < 130/80 is ideal  - check blood pressure at rest for 5-10 minute  - avoid checking blood pressure within 30 minutes of caffeine or exercise.      Tips to lower blood pressure  - lower the sodium intake in your diet. Aim for 2,000 mg per day or less  - exercise daily, especially aerobic exercise can lower blood pressure  - minimize caffeine and alcohol  - work on lowering your weight. Even small drops in weight can reduce blood pressure    Can schedule medical assistant visit for blood pressure check. Bring in home cuff in for comparison reads

## 2025-03-01 ENCOUNTER — HEALTH MAINTENANCE LETTER (OUTPATIENT)
Age: 60
End: 2025-03-01

## 2025-04-29 NOTE — RESULT ENCOUNTER NOTE
Bin,  Labs look stable from last check.  A1c shows diabetes continues to be controlled.  The kidney, liver and electrolyte panel was normal.   Cholesterol panel looks great.  Liver panel is back to normal range.  Urine protein level is normal.  Please MyChart or call if you have any concerns or questions.   Sincerely,  Coreen Mike MD     Verbal order for TSH and T4Free received from Dr. Bailey with verbal read back to confirm. Order signed and routed to provider for co signature.

## 2025-05-10 SDOH — HEALTH STABILITY: PHYSICAL HEALTH: ON AVERAGE, HOW MANY DAYS PER WEEK DO YOU ENGAGE IN MODERATE TO STRENUOUS EXERCISE (LIKE A BRISK WALK)?: 3 DAYS

## 2025-05-10 SDOH — HEALTH STABILITY: PHYSICAL HEALTH: ON AVERAGE, HOW MANY MINUTES DO YOU ENGAGE IN EXERCISE AT THIS LEVEL?: 40 MIN

## 2025-05-10 ASSESSMENT — SOCIAL DETERMINANTS OF HEALTH (SDOH): HOW OFTEN DO YOU GET TOGETHER WITH FRIENDS OR RELATIVES?: THREE TIMES A WEEK

## 2025-05-14 ENCOUNTER — OFFICE VISIT (OUTPATIENT)
Dept: FAMILY MEDICINE | Facility: CLINIC | Age: 60
End: 2025-05-14
Attending: FAMILY MEDICINE
Payer: COMMERCIAL

## 2025-05-14 VITALS
WEIGHT: 134 LBS | BODY MASS INDEX: 22.88 KG/M2 | DIASTOLIC BLOOD PRESSURE: 82 MMHG | OXYGEN SATURATION: 98 % | RESPIRATION RATE: 16 BRPM | TEMPERATURE: 98.2 F | HEIGHT: 64 IN | HEART RATE: 84 BPM | SYSTOLIC BLOOD PRESSURE: 134 MMHG

## 2025-05-14 DIAGNOSIS — Z13.220 SCREENING FOR HYPERLIPIDEMIA: ICD-10-CM

## 2025-05-14 DIAGNOSIS — L70.9 ACNE, UNSPECIFIED ACNE TYPE: ICD-10-CM

## 2025-05-14 DIAGNOSIS — E11.9 TYPE 2 DIABETES MELLITUS WITHOUT COMPLICATION, WITHOUT LONG-TERM CURRENT USE OF INSULIN (H): ICD-10-CM

## 2025-05-14 DIAGNOSIS — E55.9 VITAMIN D DEFICIENCY: ICD-10-CM

## 2025-05-14 DIAGNOSIS — Z00.00 ROUTINE GENERAL MEDICAL EXAMINATION AT A HEALTH CARE FACILITY: Primary | ICD-10-CM

## 2025-05-14 DIAGNOSIS — Z13.820 SCREENING FOR OSTEOPOROSIS: ICD-10-CM

## 2025-05-14 LAB
ALBUMIN SERPL BCG-MCNC: 4.7 G/DL (ref 3.5–5.2)
ALP SERPL-CCNC: 116 U/L (ref 40–150)
ALT SERPL W P-5'-P-CCNC: 20 U/L (ref 0–50)
ANION GAP SERPL CALCULATED.3IONS-SCNC: 10 MMOL/L (ref 7–15)
AST SERPL W P-5'-P-CCNC: 24 U/L (ref 0–45)
BILIRUB SERPL-MCNC: 0.9 MG/DL
BUN SERPL-MCNC: 10.7 MG/DL (ref 8–23)
CALCIUM SERPL-MCNC: 9.4 MG/DL (ref 8.8–10.4)
CHLORIDE SERPL-SCNC: 102 MMOL/L (ref 98–107)
CHOLEST SERPL-MCNC: 184 MG/DL
CREAT SERPL-MCNC: 0.55 MG/DL (ref 0.51–0.95)
EGFRCR SERPLBLD CKD-EPI 2021: >90 ML/MIN/1.73M2
EST. AVERAGE GLUCOSE BLD GHB EST-MCNC: 131 MG/DL
FASTING STATUS PATIENT QL REPORTED: YES
FASTING STATUS PATIENT QL REPORTED: YES
GLUCOSE SERPL-MCNC: 106 MG/DL (ref 70–99)
HBA1C MFR BLD: 6.2 % (ref 0–5.6)
HCO3 SERPL-SCNC: 29 MMOL/L (ref 22–29)
HDLC SERPL-MCNC: 76 MG/DL
LDLC SERPL CALC-MCNC: 99 MG/DL
NONHDLC SERPL-MCNC: 108 MG/DL
POTASSIUM SERPL-SCNC: 3.4 MMOL/L (ref 3.4–5.3)
PROT SERPL-MCNC: 7.2 G/DL (ref 6.4–8.3)
SODIUM SERPL-SCNC: 141 MMOL/L (ref 135–145)
TRIGL SERPL-MCNC: 43 MG/DL
TSH SERPL DL<=0.005 MIU/L-ACNC: 1.54 UIU/ML (ref 0.3–4.2)

## 2025-05-14 PROCEDURE — 82570 ASSAY OF URINE CREATININE: CPT | Performed by: PHYSICIAN ASSISTANT

## 2025-05-14 PROCEDURE — 84443 ASSAY THYROID STIM HORMONE: CPT | Performed by: PHYSICIAN ASSISTANT

## 2025-05-14 PROCEDURE — 99207 PR FOOT EXAM NO CHARGE: CPT | Performed by: PHYSICIAN ASSISTANT

## 2025-05-14 PROCEDURE — 80061 LIPID PANEL: CPT | Performed by: PHYSICIAN ASSISTANT

## 2025-05-14 PROCEDURE — 99396 PREV VISIT EST AGE 40-64: CPT | Performed by: PHYSICIAN ASSISTANT

## 2025-05-14 PROCEDURE — 99213 OFFICE O/P EST LOW 20 MIN: CPT | Mod: 25 | Performed by: PHYSICIAN ASSISTANT

## 2025-05-14 PROCEDURE — 36415 COLL VENOUS BLD VENIPUNCTURE: CPT | Performed by: PHYSICIAN ASSISTANT

## 2025-05-14 PROCEDURE — 83036 HEMOGLOBIN GLYCOSYLATED A1C: CPT | Performed by: PHYSICIAN ASSISTANT

## 2025-05-14 PROCEDURE — 80053 COMPREHEN METABOLIC PANEL: CPT | Performed by: PHYSICIAN ASSISTANT

## 2025-05-14 PROCEDURE — 82043 UR ALBUMIN QUANTITATIVE: CPT | Performed by: PHYSICIAN ASSISTANT

## 2025-05-14 RX ORDER — TRETINOIN 0.5 MG/G
CREAM TOPICAL AT BEDTIME
COMMUNITY
Start: 2023-10-10 | End: 2025-05-14

## 2025-05-14 RX ORDER — TRETINOIN 0.5 MG/G
CREAM TOPICAL AT BEDTIME
Qty: 45 G | Refills: 11 | Status: SHIPPED | OUTPATIENT
Start: 2025-05-14

## 2025-05-14 ASSESSMENT — PAIN SCALES - GENERAL: PAINLEVEL_OUTOF10: MILD PAIN (3)

## 2025-05-14 NOTE — PATIENT INSTRUCTIONS
I will notify you of lab results via [employer].    We should have results by the end of the day except vitamin D levels  I have ordered a dexa scan for screening for osteoporosis.  Check into insurance coverage.  Schedule at [facility] ([phone]) formerly called [facility].       Patient Education   Preventive Care Advice   This is general advice given by our system to help you stay healthy. However, your care team may have specific advice just for you. Please talk to your care team about your preventive care needs.  Nutrition  Eat 5 or more servings of fruits and vegetables each day.  Try wheat bread, brown rice and whole grain pasta (instead of white bread, rice, and pasta).  Get enough calcium and vitamin D. Check the label on foods and aim for 100% of the RDA (recommended daily allowance).  Lifestyle  Exercise at least 150 minutes each week  (30 minutes a day, 5 days a week).  Do muscle strengthening activities 2 days a week. These help control your weight and prevent disease.  No smoking.  Wear sunscreen to prevent skin cancer.  Have a dental exam and cleaning every 6 months.  Yearly exams  See your health care team every year to talk about:  Any changes in your health.  Any medicines your care team has prescribed.  Preventive care, family planning, and ways to prevent chronic diseases.  Shots (vaccines)   HPV shots (up to age 26), if you've never had them before.  Hepatitis B shots (up to age 59), if you've never had them before.  COVID-19 shot: Get this shot when it's due.  Flu shot: Get a flu shot every year.  Tetanus shot: Get a tetanus shot every 10 years.  Pneumococcal, hepatitis A, and RSV shots: Ask your care team if you need these based on your risk.  Shingles shot (for age 50 and up)  General health tests  Diabetes screening:  Starting at age 35, Get screened for diabetes at least every 3 years.  If you are younger than age 35, ask your care team if you should be  screened for diabetes.  Cholesterol test: At age 39, start having a cholesterol test every 5 years, or more often if advised.  Bone density scan (DEXA): At age 50, ask your care team if you should have this scan for osteoporosis (brittle bones).  Hepatitis C: Get tested at least once in your life.  STIs (sexually transmitted infections)  Before age 24: Ask your care team if you should be screened for STIs.  After age 24: Get screened for STIs if you're at risk. You are at risk for STIs (including HIV) if:  You are sexually active with more than one person.  You don't use condoms every time.  You or a partner was diagnosed with a sexually transmitted infection.  If you are at risk for HIV, ask about PrEP medicine to prevent HIV.  Get tested for HIV at least once in your life, whether you are at risk for HIV or not.  Cancer screening tests  Cervical cancer screening: If you have a cervix, begin getting regular cervical cancer screening tests starting at age 21.  Breast cancer scan (mammogram): If you've ever had breasts, begin having regular mammograms starting at age 40. This is a scan to check for breast cancer.  Colon cancer screening: It is important to start screening for colon cancer at age 45.  Have a colonoscopy test every 10 years (or more often if you're at risk) Or, ask your provider about stool tests like a FIT test every year or Cologuard test every 3 years.  To learn more about your testing options, visit:   .  For help making a decision, visit:   https://bit.ly/pw13812.  Prostate cancer screening test: If you have a prostate, ask your care team if a prostate cancer screening test (PSA) at age 55 is right for you.  Lung cancer screening: If you are a current or former smoker ages 50 to 80, ask your care team if ongoing lung cancer screenings are right for you.  For informational purposes only. Not to replace the advice of your health care provider. Copyright   2023 [employer]. All rights  reserved. Clinically reviewed by the [facility]. [employer] [id number] - REV 01/24.  Preventing Falls: Care Instructions  Injuries and health problems such as trouble walking or poor eyesight can increase your risk of falling. So can some medicines. But there are things you can do to help prevent falls. You can exercise to get stronger. You can also arrange your home to make it safer.    Talk to your doctor about the medicines you take. Ask if any of them increase the risk of falls and whether they can be changed or stopped.   Try to exercise regularly. It can help improve your strength and balance. This can help lower your risk of falling.         Practice fall safety and prevention.   Wear low-heeled shoes that fit well and give your feet good support. Talk to your doctor if you have foot problems that make this hard.  Carry a cellphone or wear a medical alert device that you can use to call for help.  Use stepladders instead of chairs to reach high objects. Don't climb if you're at risk for falls. Ask for help, if needed.  Wear the correct eyeglasses, if you need them.        Make your home safer.   Remove rugs, cords, clutter, and furniture from walkways.  Keep your house well lit. Use night-lights in hallways and bathrooms.  Install and use sturdy handrails on stairways.  Wear nonskid footwear, even inside. Don't walk barefoot or in socks without shoes.        Be safe outside.   Use handrails, curb cuts, and ramps whenever possible.  Keep your hands free by using a shoulder bag or backpack.  Try to walk in well-lit areas. Watch out for uneven ground, changes in pavement, and debris.  Be careful in the winter. Walk on the grass or gravel when sidewalks are slippery. Use de-icer on steps and walkways. Add non-slip devices to shoes.    Put grab bars and nonskid mats in your shower or tub and near the toilet. Try to use a shower chair or bath bench when bathing.   Get into a tub or shower by  "putting in your weaker leg first. Get out with your strong side first. Have a phone or medical alert device in the bathroom with you.   Where can you learn more?  Go to https://www.[employer].net/patiented  Enter G117 in the search box to learn more about \"Preventing Falls: Care Instructions.\"  Current as of: July 31, 2024  Content Version: 14.4    0969-6592 [employer].   Care instructions adapted under license by your healthcare professional. If you have questions about a medical condition or this instruction, always ask your healthcare professional. [employer] disclaims any warranty or liability for your use of this information.    Learning About Stress  What is stress?     Stress is your body's response to a hard situation. Your body can have a physical, emotional, or mental response. Stress is a fact of life for most people, and it affects everyone differently. What causes stress for you may not be stressful for someone else.  A lot of things can cause stress. You may feel stress when you go on a job interview, take a test, or run a race. This kind of short-term stress is normal and even useful. It can help you if you need to work hard or react quickly. For example, stress can help you finish an important job on time.  Long-term stress is caused by ongoing stressful situations or events. Examples of long-term stress include long-term health problems, ongoing problems at work, or conflicts in your family. Long-term stress can harm your health.  How does stress affect your health?  When you are stressed, your body responds as though you are in danger. It makes hormones that speed up your heart, make you breathe faster, and give you a burst of energy. This is called the fight-or-flight stress response. If the stress is over quickly, your body goes back to normal and no harm is done.  But if stress happens too often or lasts too long, it can have bad effects. Long-term stress can make you " more likely to get sick, and it can make symptoms of some diseases worse. If you tense up when you are stressed, you may develop neck, shoulder, or low back pain. Stress is linked to high blood pressure and heart disease.  Stress also harms your emotional health. It can make you morton, tense, or depressed. Your relationships may suffer, and you may not do well at work or school.  What can you do to manage stress?  You can try these things to help manage stress:   Do something active. Exercise or activity can help reduce stress. Walking is a great way to get started. Even everyday activities such as housecleaning or yard work can help.  Try yoga or mitra chi. These techniques combine exercise and meditation. You may need some training at first to learn them.  Do something you enjoy. For example, listen to music or go to a movie. Practice your hobby or do volunteer work.  Meditate. This can help you relax, because you are not worrying about what happened before or what may happen in the future.  Do guided imagery. Imagine yourself in any setting that helps you feel calm. You can use online videos, books, or a teacher to guide you.  Do breathing exercises. For example:  From a standing position, bend forward from the waist with your knees slightly bent. Let your arms dangle close to the floor.  Breathe in slowly and deeply as you return to a standing position. Roll up slowly and lift your head last.  Hold your breath for just a few seconds in the standing position.  Breathe out slowly and bend forward from the waist.  Let your feelings out. Talk, laugh, cry, and express anger when you need to. Talking with supportive friends or family, a counselor, or a maximilian leader about your feelings is a healthy way to relieve stress. Avoid discussing your feelings with people who make you feel worse.  Write. It may help to write about things that are bothering you. This helps you find out how much stress you feel and what is causing  "it. When you know this, you can find better ways to cope.  What can you do to prevent stress?  You might try some of these things to help prevent stress:  Manage your time. This helps you find time to do the things you want and need to do.  Get enough sleep. Your body recovers from the stresses of the day while you are sleeping.  Get support. Your family, friends, and community can make a difference in how you experience stress.  Limit your news feed. Avoid or limit time on social media or news that may make you feel stressed.  Do something active. Exercise or activity can help reduce stress. Walking is a great way to get started.  Where can you learn more?  Go to https://www.[employer].net/patiented  Enter N032 in the search box to learn more about \"Learning About Stress.\"  Current as of: October 24, 2024  Content Version: 14.4    9868-4927 [employer].   Care instructions adapted under license by your healthcare professional. If you have questions about a medical condition or this instruction, always ask your healthcare professional. [employer] disclaims any warranty or liability for your use of this information.       "

## 2025-05-14 NOTE — PROGRESS NOTES
Preventive Care Visit  [facility]  Dunia Herron PA-C, Family Medicine  May 14, 2025      Assessment & Plan     Routine general medical examination at a health care facility  Normal exam  Has eye exam scheduled.     Type 2 diabetes mellitus without complication, without long-term current use of insulin (H)  A1C 6.2- well controlled without medication.  Declines statin.  Labs pending    - HEMOGLOBIN A1C  - COMPREHENSIVE METABOLIC PANEL  - Adult Eye  Referral  - Albumin Random Urine Quantitative with Creat Ratio  - TSH with free T4 reflex  - FOOT EXAM  - HEMOGLOBIN A1C  - COMPREHENSIVE METABOLIC PANEL  - Albumin Random Urine Quantitative with Creat Ratio  - TSH with free T4 reflex    Acne, unspecified acne type  Refilled   - tretinoin (RETIN-A) 0.05 % external cream  Dispense: 45 g; Refill: 11    Screening for osteoporosis  Has osteopenia- due for dexa 6/8/25- likely to schedule in October   - DEXA HIP/PELVIS/SPINE - Future      Screening for hyperlipidemia    - Lipid panel reflex to direct LDL Fasting  - Lipid panel reflex to direct LDL Fasting    Vitamin D deficiency  Takes vitamin D  - 25 Hydroxyvitamin D2 and D3  - 25 Hydroxyvitamin D2 and D3       I will notify you of lab results via [employer].    We should have results by the end of the day except vitamin D levels  I have ordered a dexa scan for screening for osteoporosis.  Check into insurance coverage.  Schedule at [facility] ([phone]) formerly called [facility].         Counseling  Appropriate preventive services were addressed with this patient via screening, questionnaire, or discussion as appropriate for fall prevention, nutrition, physical activity, Tobacco-use cessation, social engagement, weight loss and cognition.  Checklist reviewing preventive services available has been given to the patient.  Reviewed patient's diet, addressing concerns and/or questions.   She is at risk  for lack of exercise and has been provided with information to increase physical activity for the benefit of her well-being.   She is at risk for psychosocial distress and has been provided with information to reduce risk.       Follow-up    Follow-up Visit   Expected date:  Aug 14, 2025 (Approximate)      Follow Up Appointment Details:     Follow-up with whom?: My Department    Follow-Up for what?: Chronic Disease f/u    Chronic Disease f/u: Diabetes    How?: In Person    Is this an as-needed follow-up?: No             Follow-up Visit   Expected date:  May 14, 2026 (Approximate)      Follow Up Appointment Details:     Follow-up with whom?: PCP    Follow-Up for what?: Adult Preventive    How?: In Person                 Brook De La Fuente is a 60 year old, presenting for the following:  Hypertension and Diabetes          HPI  Patient unfamiliar to me with history of hypertension and diabetes as well as acne presents for annual exam. Pt would like rx for her face- dermatologist has prescribed retin A for her acne and also to improve dark spots.   Daughter lives in the area with her family and 8 month old grandchild.   Patient moved to [place] - she enjoys playing tennis and walking  She has fallen twice but it has been when she has been vigorously playing tennis.  Planning to return to the area in October  She has declined all medications for diabetes and hypertension and prefers to manage with diet and exercise.  Her exam is scheduled this week.  History of ASCUS pap in 2016.  Normal since           Advance Care Planning    Discussed advance care planning with patient; however, patient declined at this time.        5/10/2025   General Health   How would you rate your overall physical health? Good   Feel stress (tense, anxious, or unable to sleep) To some extent   (!) STRESS CONCERN      5/10/2025   Nutrition   Three or more servings of calcium each day? Yes   Diet: Carbohydrate counting   How many servings of fruit and  vegetables per day? 4 or more   How many sweetened beverages each day? 0-1         5/10/2025   Exercise   Days per week of moderate/strenous exercise 3 days   Average minutes spent exercising at this level 40 min         5/10/2025   Social Factors   Frequency of gathering with friends or relatives Three times a week   Worry food won't last until get money to buy more No   Food not last or not have enough money for food? No   Do you have housing? (Housing is defined as stable permanent housing and does not include staying outside in a car, in a tent, in an abandoned building, in an overnight shelter, or couch-surfing.) Yes   Are you worried about losing your housing? No   Lack of transportation? No   Unable to get utilities (heat,electricity)? No         5/10/2025   Fall Risk   Fallen 2 or more times in the past year? Yes   Trouble with walking or balance? No           5/10/2025   Dental   Dentist two times every year? Yes         Today's PHQ-2 Score:       5/14/2025     8:10 AM   PHQ-2 ( 1999 [employer])   Q1: Little interest or pleasure in doing things 0   Q2: Feeling down, depressed or hopeless 0   PHQ-2 Score 0    Q1: Little interest or pleasure in doing things Not at all   Q2: Feeling down, depressed or hopeless Not at all   PHQ-2 Score 0       Patient-reported           5/10/2025   Substance Use   Alcohol more than 3/day or more than 7/wk Not Applicable   Do you use any other substances recreationally? No     Social History     Tobacco Use    Smoking status: Never     Passive exposure: Never    Smokeless tobacco: Never   Vaping Use    Vaping status: Never Used   Substance Use Topics    Alcohol use: Not Currently     Comment: rare    Drug use: No           12/6/2023   LAST FHS-7 RESULTS   1st degree relative breast or ovarian cancer No   Any relative bilateral breast cancer No   Any male have breast cancer No   Any ONE woman have BOTH breast AND ovarian cancer No   Any woman with breast cancer before 50yrs No   2 or  more relatives with breast AND/OR ovarian cancer No   2 or more relatives with breast AND/OR bowel cancer No        Mammogram Screening - Mammogram every 1-2 years updated in Health Maintenance based on mutual decision making        5/10/2025   STI Screening   New sexual partner(s) since last STI/HIV test? No     History of abnormal Pap smear: YES - reflected in Problem List and Health Maintenance accordingly        Latest Ref Rng & Units 5/29/2024     1:51 PM 9/30/2019     7:39 AM 9/30/2019     7:33 AM   PAP / HPV   PAP  Negative for Intraepithelial Lesion or Malignancy (NILM)      PAP (Historical)   NIL     HPV 16 DNA Negative Negative   Negative    HPV 18 DNA Negative Negative   Negative    Other HR HPV Negative Negative   Negative      ASCVD Risk   The 10-year ASCVD risk score (Gloria GARY, et al., 2019) is: 4.2%    Values used to calculate the score:      Age: 60 years      Sex: Female      Is Non- : No      Diabetic: Yes      Tobacco smoker: No      Systolic Blood Pressure: 134 mmHg      Is BP treated: No      HDL Cholesterol: 79 mg/dL      Total Cholesterol: 172 mg/dL           Reviewed and updated as needed this visit by Provider   Tobacco   Meds   Med Hx  Surg Hx  Fam Hx  Soc Hx Sexual Activity            BP Readings from Last 3 Encounters:   05/14/25 134/82   05/29/24 108/73   06/08/23 119/76    Wt Readings from Last 3 Encounters:   05/14/25 60.8 kg (134 lb)   05/29/24 60.1 kg (132 lb 8 oz)   06/08/23 64.6 kg (142 lb 6.4 oz)                  Patient Active Problem List   Diagnosis    History of infertility, female    Nevi    CARDIOVASCULAR SCREENING; LDL GOAL LESS THAN 160    Uterine fibroid    ASCUS of cervix with negative high risk HPV    Nephrolithiasis    Acute pain of right shoulder    Type 2 diabetes mellitus without complication, without long-term current use of insulin (H)    Osteopenia     Past Surgical History:   Procedure Laterality Date    BIOPSY  11/20/2007     left breast - benign    C ANESTH, SECTION  1994    COLONOSCOPY WITH CO2 INSUFFLATION N/A 2017    Procedure: COLONOSCOPY WITH CO2 INSUFFLATION;  Surgeon: Duane, William Charles, MD;  Location: [facility] OR    HC DILATION/CURETTAGE DIAG/THER NON OB      following incomplete ab       Social History     Tobacco Use    Smoking status: Never     Passive exposure: Never    Smokeless tobacco: Never   Substance Use Topics    Alcohol use: Not Currently     Comment: rare     Family History   Problem Relation Age of Onset    C.A.D. Mother         started later 40's or early 50s'.     Diabetes Mother     Hypertension Mother     Coronary Artery Disease Mother     Hyperlipidemia Mother     Cancer Father         lung, former smoker    Other Cancer Father         Lung    Diabetes Sister         borderline diabetes    Thyroid Cancer Sister         Thyroid    Other Cancer Sister         Thyroid    Diabetes Brother          Current Outpatient Medications   Medication Sig Dispense Refill    B Complex-Biotin-FA (HM VITAMIN B100 COMPLEX PO)       CALCIUM-MAGNESIUM-ZINC PO       Cholecalciferol (VITAMIN D3 PO) Take 2,000 Units by mouth daily      Coenzyme Q10 (CO Q 10 PO)       tretinoin (RETIN-A) 0.05 % external cream Apply topically at bedtime. 45 g 11         Review of Systems  CONSTITUTIONAL: NEGATIVE for fever, chills, change in weight  INTEGUMENTARY/SKIN: has a skin lesion on chin had removed by derm and grew back, has acne treated with retin A  EYES: NEGATIVE for vision changes or irritation  ENT/MOUTH: NEGATIVE for ear, mouth and throat problems  RESP: NEGATIVE for significant cough or SOB  BREAST: NEGATIVE for masses, tenderness or discharge  CV: NEGATIVE for chest pain, palpitations or peripheral edema  GI: NEGATIVE for nausea, abdominal pain, heartburn, or change in bowel habits  : NEGATIVE for frequency, dysuria, or hematuria  MUSCULOSKELETAL: NEGATIVE for significant arthralgias or myalgia- has knee abrasions  "  NEURO: NEGATIVE for weakness, dizziness or paresthesias  ENDOCRINE: NEGATIVE for temperature intolerance, skin/hair changes  HEME: NEGATIVE for bleeding problems  PSYCHIATRIC: NEGATIVE for changes in mood or affect     Objective    Exam  /82 (BP Location: Right arm, Patient Position: Sitting, Cuff Size: Adult Regular)   Pulse 84   Temp 98.2  F (36.8  C) (Oral)   Resp 16   Ht 1.626 m (5' 4\")   Wt 60.8 kg (134 lb)   LMP 04/10/2015 (Approximate)   SpO2 98%   BMI 23.00 kg/m     Estimated body mass index is 23 kg/m  as calculated from the following:    Height as of this encounter: 1.626 m (5' 4\").    Weight as of this encounter: 60.8 kg (134 lb).    Physical Exam  GENERAL: alert and no distress  EYES: Eyes grossly normal to inspection, PERRL and conjunctivae and sclerae normal  HENT: ear canals and TM's normal, nose and mouth without ulcers or lesions  NECK: no adenopathy, no asymmetry, masses, or scars  RESP: lungs clear to auscultation - no rales, rhonchi or wheezes  BREAST: normal without masses, tenderness or nipple discharge and no palpable axillary masses or adenopathy  CV: regular rate and rhythm, normal S1 S2, no S3 or S4, no murmur, click or rub, no peripheral edema  ABDOMEN: soft, nontender, no hepatosplenomegaly, no masses and bowel sounds normal   (female): normal female external genitalia, normal urethral meatus , vaginal mucosal atrophy, and normal cervix, adnexae, and uterus without masses.  MS: no gross musculoskeletal defects noted, no edema  SKIN: no suspicious lesions or rashes  NEURO: Normal strength and tone, mentation intact and speech normal  PSYCH: mentation appears normal, affect normal/bright    Results for orders placed or performed in visit on 05/14/25   HEMOGLOBIN A1C     Status: Abnormal   Result Value Ref Range    Estimated Average Glucose 131 (H) <117 mg/dL    Hemoglobin A1C 6.2 (H) 0.0 - 5.6 %        Signed Electronically by: Dunia Herron PA-C    "

## 2025-05-15 ENCOUNTER — TRANSFERRED RECORDS (OUTPATIENT)
Dept: HEALTH INFORMATION MANAGEMENT | Facility: CLINIC | Age: 60
End: 2025-05-15
Payer: COMMERCIAL

## 2025-05-15 ENCOUNTER — PATIENT OUTREACH (OUTPATIENT)
Dept: CARE COORDINATION | Facility: CLINIC | Age: 60
End: 2025-05-15
Payer: COMMERCIAL

## 2025-05-15 ENCOUNTER — RESULTS FOLLOW-UP (OUTPATIENT)
Dept: FAMILY MEDICINE | Facility: CLINIC | Age: 60
End: 2025-05-15

## 2025-05-15 LAB
CREAT UR-MCNC: 26.4 MG/DL
MICROALBUMIN UR-MCNC: <12 MG/L
MICROALBUMIN/CREAT UR: NORMAL MG/G{CREAT}
RETINOPATHY: NEGATIVE

## 2025-05-15 NOTE — RESULT ENCOUNTER NOTE
Dear Sudhakar   It was a pleasure meeting you in clinic.  Your electrolytes, kidney function and liver function were normal.    Your A1C was 6.2- this is in the prediabetes range  Your urine does not show excess protein.   Your thyroid function was normal.    Your cholesterol looks excellent.  Your vitamin D level is still in process.    Please call or MyChart my office with any questions or concerns.   Dunia Herron, PAC

## 2025-05-17 LAB
DEPRECATED CALCIDIOL+CALCIFEROL SERPL-MC: <60 UG/L (ref 20–75)
VITAMIN D2 SERPL-MCNC: <5 UG/L
VITAMIN D3 SERPL-MCNC: 55 UG/L

## 2025-05-17 NOTE — RESULT ENCOUNTER NOTE
Dear Sudhakar  Your vitamin D level was normal.  Continue supplements as you have been taking.   Please call or MyChart my office with any questions or concerns.   Dunia Herron, PAC

## 2025-05-19 ENCOUNTER — PATIENT OUTREACH (OUTPATIENT)
Dept: CARE COORDINATION | Facility: CLINIC | Age: 60
End: 2025-05-19
Payer: COMMERCIAL

## 2025-08-23 ENCOUNTER — HEALTH MAINTENANCE LETTER (OUTPATIENT)
Age: 60
End: 2025-08-23